# Patient Record
Sex: MALE | Race: WHITE | NOT HISPANIC OR LATINO | Employment: OTHER | URBAN - METROPOLITAN AREA
[De-identification: names, ages, dates, MRNs, and addresses within clinical notes are randomized per-mention and may not be internally consistent; named-entity substitution may affect disease eponyms.]

---

## 2018-12-11 ENCOUNTER — TRANSFERRED RECORDS (OUTPATIENT)
Dept: HEALTH INFORMATION MANAGEMENT | Facility: CLINIC | Age: 83
End: 2018-12-11

## 2019-01-03 ENCOUNTER — HOSPITAL ENCOUNTER (OUTPATIENT)
Dept: INTERVENTIONAL RADIOLOGY/VASCULAR | Facility: HOSPITAL | Age: 84
Discharge: HOME | End: 2019-01-03
Attending: FAMILY MEDICINE | Admitting: FAMILY MEDICINE
Payer: MEDICARE

## 2019-01-03 DIAGNOSIS — M16.10 HIP ARTHRITIS: ICD-10-CM

## 2019-01-03 PROCEDURE — 25500064 ZZH RX 255 OP 636: Performed by: RADIOLOGY

## 2019-01-03 PROCEDURE — 25000128 H RX IP 250 OP 636: Performed by: RADIOLOGY

## 2019-01-03 PROCEDURE — 20610 DRAIN/INJ JOINT/BURSA W/O US: CPT | Mod: TC,RT

## 2019-01-03 RX ORDER — TRIAMCINOLONE ACETONIDE 40 MG/ML
INJECTION, SUSPENSION INTRA-ARTICULAR; INTRAMUSCULAR
Status: DISCONTINUED
Start: 2019-01-03 | End: 2019-01-04 | Stop reason: HOSPADM

## 2019-01-03 RX ORDER — LIDOCAINE HYDROCHLORIDE 10 MG/ML
10 INJECTION, SOLUTION INFILTRATION; PERINEURAL ONCE
Status: COMPLETED | OUTPATIENT
Start: 2019-01-03 | End: 2019-01-03

## 2019-01-03 RX ORDER — TRIAMCINOLONE ACETONIDE 40 MG/ML
40 INJECTION, SUSPENSION INTRA-ARTICULAR; INTRAMUSCULAR ONCE
Status: COMPLETED | OUTPATIENT
Start: 2019-01-03 | End: 2019-01-03

## 2019-01-03 RX ORDER — IOPAMIDOL 612 MG/ML
50 INJECTION, SOLUTION INTRAVASCULAR ONCE
Status: COMPLETED | OUTPATIENT
Start: 2019-01-03 | End: 2019-01-03

## 2019-01-03 RX ORDER — LIDOCAINE HYDROCHLORIDE 10 MG/ML
INJECTION, SOLUTION EPIDURAL; INFILTRATION; INTRACAUDAL; PERINEURAL
Status: DISCONTINUED
Start: 2019-01-03 | End: 2019-01-04 | Stop reason: HOSPADM

## 2019-01-03 RX ADMIN — IOPAMIDOL 3 ML: 612 INJECTION, SOLUTION INTRAVENOUS at 16:02

## 2019-01-03 RX ADMIN — LIDOCAINE HYDROCHLORIDE 10 ML: 10 INJECTION, SOLUTION INFILTRATION; PERINEURAL at 16:03

## 2019-01-03 RX ADMIN — TRIAMCINOLONE ACETONIDE 40 MG: 40 INJECTION, SUSPENSION INTRA-ARTICULAR; INTRAMUSCULAR at 16:03

## 2019-01-03 NOTE — IP AVS SNAPSHOT
[facility]  [address] [address]  Phone:  [phone]  Fax:  [phone]                                    After Visit Summary   1/3/2019    Vinny Henry    MRN: [id number]           After Visit Summary Signature Page    I have received my discharge instructions, and my questions have been answered. I have discussed any challenges I see with this plan with the nurse or doctor.    ..........................................................................................................................................  Patient/Patient Representative Signature      ..........................................................................................................................................  Patient Representative Print Name and Relationship to Patient    ..................................................               ................................................  Date                                   Time    ..........................................................................................................................................  Reviewed by Signature/Title    ...................................................              ..............................................  Date                                               Time          22EPIC Rev 08/18

## 2019-01-03 NOTE — DISCHARGE INSTRUCTIONS
Home number on file [phone] (home)  Is it ok to leave a message at this number(s)? Yes    Dr French completed your procedure on 1/3/2019.    Current Pain Level (0-10 Scale): 5/10  Post Pain Level (0-10):  2/10    Radiology Discharge instructions for Steroid Injection    Activity Level:     Do not do any heavy activity or exercise for 24 hours.   Do not drive for 4 hours after your injection.  Diet:   Return to your normal diet.  Medications:   If you have stopped taking your Aspirin, Coumadin/Warfarin, Ibuprofen, or any   other blood thinner for this procedure you may resume in the morning unless   your primary care provider has given you other instructions.    Diabetics may see an increase in blood sugar after steroid injections. If you are concerned about your blood sugar, please contact your family doctor.    Site Care:  Remove the bandage and bathe or shower the morning after the procedure.      Please allow two weeks to experience improvement in your pain.  If you have any further issues, please contact your provider.    Call your Primary Care Provider if you have the following (if your primary care provider is not available please seek emergency care):   Nausea with vomiting   Severe headache   Drowsiness or confusion   Redness or drainage at the injection or puncture site   Temperature over 101 degrees F   Other concerns   Worsening back pain   Stiff neck

## 2019-01-03 NOTE — IP AVS SNAPSHOT
MRN:[id number]                      After Visit Summary   1/3/2019    Vinny Henry    MRN: [id number]           Visit Information        Provider Department      1/3/2019  3:30 PM [facility]; [facility]           Review of your medicines      UNREVIEWED medicines. Ask your doctor about these medicines       Dose / Directions   calcium carbonate 600 mg-vitamin D 400 units 600-400 MG-UNIT per tablet  Commonly known as:  CALTRATE      Dose:  1 tablet  Take 1 tablet by mouth daily  Refills:  0     FLOMAX 0.4 MG capsule  Generic drug:  tamsulosin      Dose:  0.4 mg  Take 0.4 mg by mouth  Refills:  0     HYDROcodone-acetaminophen 5-325 MG tablet  Commonly known as:  NORCO      Dose:  1 tablet  Take 1 tablet by mouth every 6 hours as needed for moderate to severe pain  Quantity:  15 tablet  Refills:  0     OMEPRAZOLE PO      Dose:  20 mg  Take 20 mg by mouth every morning  Refills:  0     polyethylene glycol packet  Commonly known as:  MIRALAX/GLYCOLAX      Dose:  1 packet  Take 1 packet by mouth as needed  Refills:  0     TERAZOSIN HCL PO      Dose:  1 mg  Take 1 mg by mouth Med was stopped by MD last Friday, pt took one last night to see if it would help him pass his urine.  Refills:  0     * UNKNOWN TO PATIENT      To shrink his prostate, blue pill  Refills:  0     * UNKNOWN TO PATIENT      New antibiotic  Refills:  0         * This list has 2 medication(s) that are the same as other medications prescribed for you. Read the directions carefully, and ask your doctor or other care provider to review them with you.                  Protect others around you: Learn how to safely use, store and throw away your medicines at www.disposemymeds.org.       Follow-ups after your visit       Care Instructions       Further instructions from your care team       Home number on file [phone] (home)  Is it ok to leave a message at this number(s)? Yes    Dr French completed your procedure on  1/3/2019.    Current Pain Level (0-10 Scale): 5/10  Post Pain Level (0-10):  2/10    Radiology Discharge instructions for Steroid Injection    Activity Level:     Do not do any heavy activity or exercise for 24 hours.   Do not drive for 4 hours after your injection.  Diet:   Return to your normal diet.  Medications:   If you have stopped taking your Aspirin, Coumadin/Warfarin, Ibuprofen, or any   other blood thinner for this procedure you may resume in the morning unless   your primary care provider has given you other instructions.    Diabetics may see an increase in blood sugar after steroid injections. If you are concerned about your blood sugar, please contact your family doctor.    Site Care:  Remove the bandage and bathe or shower the morning after the procedure.      Please allow two weeks to experience improvement in your pain.  If you have any further issues, please contact your provider.    Call your Primary Care Provider if you have the following (if your primary care provider is not available please seek emergency care):   Nausea with vomiting   Severe headache   Drowsiness or confusion   Redness or drainage at the injection or puncture site   Temperature over 101 degrees F   Other concerns   Worsening back pain   Stiff neck          Additional Information About Your Visit       Care EveryWhere ID    This is your Care EveryWhere ID. This could be used by other organizations to access your [facility] medical records  [id number]        Primary Care Provider Office Phone # Fax #    Johanna Duran [phone]      Equal Access to Services    [facility] : Hadii aad ku hadasho Soalonzoali, waaxda luqadaha, qaybta kaalmada adeegyajames, chloe krueger . [facility] [phone].    ATENCIÓN: Si habla español, tiene a armstrong disposición servicios gratuitos de asistencia lingüística. Llame al [phone].    We comply with applicable federal civil rights laws and [place]  laws. We do not discriminate on the basis of race, color, national origin, age, disability, sex, sexual orientation, or gender identity.           Thank you!    Thank you for choosing [facility] for your care. Our goal is always to provide you with excellent care. Hearing back from our patients is one way we can continue to improve our services. Please take a few minutes to complete the written survey that you may receive in the mail after you visit with us. Thank you!            Medication List      ASK your doctor about these medications          Morning Afternoon Evening Bedtime As Needed    calcium carbonate 600 mg-vitamin D 400 units 600-400 MG-UNIT per tablet  Also known as:  CALTRATE  INSTRUCTIONS:  Take 1 tablet by mouth daily                     FLOMAX 0.4 MG capsule  INSTRUCTIONS:  Take 0.4 mg by mouth  Generic drug:  tamsulosin                     HYDROcodone-acetaminophen 5-325 MG tablet  Also known as:  NORCO  INSTRUCTIONS:  Take 1 tablet by mouth every 6 hours as needed for moderate to severe pain                     OMEPRAZOLE PO  INSTRUCTIONS:  Take 20 mg by mouth every morning                     polyethylene glycol packet  Also known as:  MIRALAX/GLYCOLAX  INSTRUCTIONS:  Take 1 packet by mouth as needed                     TERAZOSIN HCL PO  INSTRUCTIONS:  Take 1 mg by mouth Med was stopped by MD last Friday, pt took one last night to see if it would help him pass his urine.                     * UNKNOWN TO PATIENT  INSTRUCTIONS:  To shrink his prostate, blue pill                     * UNKNOWN TO PATIENT  INSTRUCTIONS:  New antibiotic                        * This list has 2 medication(s) that are the same as other medications prescribed for you. Read the directions carefully, and ask your doctor or other care provider to review them with you.

## 2019-03-12 ENCOUNTER — TRANSFERRED RECORDS (OUTPATIENT)
Dept: HEALTH INFORMATION MANAGEMENT | Facility: HOSPITAL | Age: 84
End: 2019-03-12

## 2019-04-15 ENCOUNTER — TRANSFERRED RECORDS (OUTPATIENT)
Dept: HEALTH INFORMATION MANAGEMENT | Facility: HOSPITAL | Age: 84
End: 2019-04-15

## 2019-04-15 RX ORDER — FINASTERIDE 5 MG/1
5 TABLET, FILM COATED ORAL DAILY
COMMUNITY
End: 2024-06-03

## 2019-04-15 NOTE — OR NURSING
Email sent to total joint replacement team at [facility] as follows;    We have Vinny Henry : [birth year] coming  for RTHA with Dr. Medel, PCP Dr. Duran.  Gabe attended total joint replacement class on 04/10/19 and he has had his left hip replaced.  He has history of falling in hospital after left hip replacement because he got up out of bed on his own.  He plans on going to his home in [place] after hospitalization with his wife s assistance.  He has three steps to enter home and then three step to enter kitchen.  He does not have a walker, or grab bars, does have a higher toilet.    Reviewed the following topics with Gabe and his wife;  Call don t Fall , Capnography Monitoring, Signs & Symptoms of Infection to watch/report and prevent.  They both verbalized good understanding of all topics, especially  Call don t Fall .    Thank you,  Anabel Avalos R.N.  Pre-Anesthesia Testing Surgical Education  [facility]

## 2019-04-16 RX ORDER — ACETAMINOPHEN 500 MG
500-1000 TABLET ORAL EVERY 6 HOURS PRN
COMMUNITY

## 2019-04-18 ENCOUNTER — ANESTHESIA EVENT (OUTPATIENT)
Dept: SURGERY | Facility: HOSPITAL | Age: 84
DRG: 470 | End: 2019-04-18
Payer: MEDICARE

## 2019-04-18 ASSESSMENT — LIFESTYLE VARIABLES: TOBACCO_USE: 1

## 2019-04-18 NOTE — ANESTHESIA PREPROCEDURE EVALUATION
"Anesthesia Pre-Procedure Evaluation    Patient: Vinny Henry   MRN: [id number] : 1935          Preoperative Diagnosis: DJD RIGHT HIP    Procedure(s):  DIRECT ANTERIOR RIGHT TOTAL HIP    History reviewed. No pertinent past medical history.  Past Surgical History:   Procedure Laterality Date     ARTHROPLASTY HIP      left       Anesthesia Evaluation     . Pt has had prior anesthetic.     No history of anesthetic complications          ROS/MED HX    ENT/Pulmonary:     (+)tobacco use, Past use , . .    Neurologic:  - neg neurologic ROS     Cardiovascular: Comment: Had a stress test in 2012, EF 64%, no issues    At time of L hip replacement in 2013 had some \"dropped p waves\" noted by anesthesia.  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  Pt has urologic surgery since that time with no issues    (+) hypertension----. : . . . :. . Previous cardiac testing date:results:date: results:ECG reviewed date:4-15-19 results:NSR with old t waves noted date: results:          METS/Exercise Tolerance:  3 - Able to walk 1-2 blocks without stopping   Hematologic:  - neg hematologic  ROS       Musculoskeletal:   (+) arthritis,  -       GI/Hepatic:     (+) GERD       Renal/Genitourinary:  - ROS Renal section negative       Endo:     (+) Obesity, .      Psychiatric:  - neg psychiatric ROS       Infectious Disease:  - neg infectious disease ROS       Malignancy:   (+) Malignancy History of Other  Other CA throat Remission status post Radiation         Other:    (+) C-spine cleared: N/A, no H/O Chronic Pain,no other significant disability                         Physical Exam  Normal systems: cardiovascular    Airway   Mallampati: II  TM distance: >3 FB  Neck ROM: full    Dental   (+) upper dentures and lower dentures    Cardiovascular "   Rhythm and rate: regular and normal      Pulmonary (+) decreased breath sounds               No results found for: WBC, HGB, HCT, PLT, CRP, SED, NA, POTASSIUM, CHLORIDE, CO2, BUN, CR, GLC, JANINE, PHOS, MAG, ALBUMIN, PROTTOTAL, ALT, AST, GGT, ALKPHOS, BILITOTAL, BILIDIRECT, LIPASE, AMYLASE, AMARA, PTT, INR, FIBR, TSH, T4, T3, HCG, HCGS, CKTOTAL, CKMB, TROPN    Preop Vitals  BP Readings from Last 3 Encounters:   07/01/16 147/86   03/22/14 125/76   02/16/14 123/88    Pulse Readings from Last 3 Encounters:   07/01/16 67      Resp Readings from Last 3 Encounters:   07/01/16 20   03/22/14 20   02/16/14 20    SpO2 Readings from Last 3 Encounters:   07/01/16 97%   02/16/14 94%   12/13/13 97%      Temp Readings from Last 1 Encounters:   07/01/16 98.3  F (36.8  C) (Tympanic)    Ht Readings from Last 1 Encounters:   No data found for Ht      Wt Readings from Last 1 Encounters:   No data found for Wt    There is no height or weight on file to calculate BMI.       Anesthesia Plan      History & Physical Review  History and physical reviewed and following examination; no interval change.    ASA Status:  3 .    NPO Status:  > 8 hours (0630 sip of water with pills, last night for food)    Plan for Spinal and MAC Maintenance will be TIVA.    PONV prophylaxis:  Ondansetron (or other 5HT-3) and Dexamethasone or Solumedrol  HP 4-15-19      Postoperative Care  Postoperative pain management:  IV analgesics.      Consents  Anesthetic plan, risks, benefits and alternatives discussed with:  Patient..                 MARU Limon CRNA

## 2019-04-22 ENCOUNTER — APPOINTMENT (OUTPATIENT)
Dept: GENERAL RADIOLOGY | Facility: HOSPITAL | Age: 84
DRG: 470 | End: 2019-04-22
Attending: ORTHOPAEDIC SURGERY
Payer: MEDICARE

## 2019-04-22 ENCOUNTER — HOSPITAL ENCOUNTER (INPATIENT)
Facility: HOSPITAL | Age: 84
LOS: 2 days | Discharge: HOME | DRG: 470 | End: 2019-04-24
Attending: ORTHOPAEDIC SURGERY | Admitting: ORTHOPAEDIC SURGERY
Payer: MEDICARE

## 2019-04-22 ENCOUNTER — APPOINTMENT (OUTPATIENT)
Dept: PHYSICAL THERAPY | Facility: HOSPITAL | Age: 84
DRG: 470 | End: 2019-04-22
Attending: ORTHOPAEDIC SURGERY
Payer: MEDICARE

## 2019-04-22 ENCOUNTER — ANESTHESIA (OUTPATIENT)
Dept: SURGERY | Facility: HOSPITAL | Age: 84
DRG: 470 | End: 2019-04-22
Payer: MEDICARE

## 2019-04-22 DIAGNOSIS — Z96.641 STATUS POST RIGHT HIP REPLACEMENT: Primary | ICD-10-CM

## 2019-04-22 PROCEDURE — 40000277 XR SURGERY CARM FLUORO LESS THAN 5 MIN W STILLS: Mod: TC

## 2019-04-22 PROCEDURE — 25000128 H RX IP 250 OP 636: Performed by: NURSE ANESTHETIST, CERTIFIED REGISTERED

## 2019-04-22 PROCEDURE — 71000015 ZZH RECOVERY PHASE 1 LEVEL 2 EA ADDTL HR: Performed by: ORTHOPAEDIC SURGERY

## 2019-04-22 PROCEDURE — C9290 INJ, BUPIVACAINE LIPOSOME: HCPCS | Performed by: ORTHOPAEDIC SURGERY

## 2019-04-22 PROCEDURE — 37000008 ZZH ANESTHESIA TECHNICAL FEE, 1ST 30 MIN: Performed by: ORTHOPAEDIC SURGERY

## 2019-04-22 PROCEDURE — 71000014 ZZH RECOVERY PHASE 1 LEVEL 2 FIRST HR: Performed by: ORTHOPAEDIC SURGERY

## 2019-04-22 PROCEDURE — 27110028 ZZH OR GENERAL SUPPLY NON-STERILE: Performed by: ORTHOPAEDIC SURGERY

## 2019-04-22 PROCEDURE — C1776 JOINT DEVICE (IMPLANTABLE): HCPCS | Performed by: ORTHOPAEDIC SURGERY

## 2019-04-22 PROCEDURE — 25800030 ZZH RX IP 258 OP 636: Performed by: ORTHOPAEDIC SURGERY

## 2019-04-22 PROCEDURE — 25000125 ZZHC RX 250: Performed by: NURSE ANESTHETIST, CERTIFIED REGISTERED

## 2019-04-22 PROCEDURE — 99231 SBSQ HOSP IP/OBS SF/LOW 25: CPT | Performed by: INTERNAL MEDICINE

## 2019-04-22 PROCEDURE — 25000128 H RX IP 250 OP 636: Performed by: ORTHOPAEDIC SURGERY

## 2019-04-22 PROCEDURE — 12000000 ZZH R&B MED SURG/OB

## 2019-04-22 PROCEDURE — 40000985 XR PELVIS PORT 1/2 VW: Mod: TC

## 2019-04-22 PROCEDURE — 25000125 ZZHC RX 250: Performed by: ORTHOPAEDIC SURGERY

## 2019-04-22 PROCEDURE — 37000009 ZZH ANESTHESIA TECHNICAL FEE, EACH ADDTL 15 MIN: Performed by: ORTHOPAEDIC SURGERY

## 2019-04-22 PROCEDURE — 0SR906Z REPLACEMENT OF RIGHT HIP JOINT WITH OXIDIZED ZIRCONIUM ON POLYETHYLENE SYNTHETIC SUBSTITUTE, OPEN APPROACH: ICD-10-PCS | Performed by: ORTHOPAEDIC SURGERY

## 2019-04-22 PROCEDURE — 25000132 ZZH RX MED GY IP 250 OP 250 PS 637: Performed by: ORTHOPAEDIC SURGERY

## 2019-04-22 PROCEDURE — 99100 ANES PT EXTEME AGE<1 YR&>70: CPT | Performed by: NURSE ANESTHETIST, CERTIFIED REGISTERED

## 2019-04-22 PROCEDURE — 25800030 ZZH RX IP 258 OP 636: Performed by: NURSE ANESTHETIST, CERTIFIED REGISTERED

## 2019-04-22 PROCEDURE — 88300 SURGICAL PATH GROSS: CPT | Mod: TC | Performed by: ORTHOPAEDIC SURGERY

## 2019-04-22 PROCEDURE — 97161 PT EVAL LOW COMPLEX 20 MIN: CPT | Mod: GP

## 2019-04-22 PROCEDURE — 40000275 ZZH STATISTIC RCP TIME EA 10 MIN

## 2019-04-22 PROCEDURE — 27210794 ZZH OR GENERAL SUPPLY STERILE: Performed by: ORTHOPAEDIC SURGERY

## 2019-04-22 PROCEDURE — A9270 NON-COVERED ITEM OR SERVICE: HCPCS | Performed by: ORTHOPAEDIC SURGERY

## 2019-04-22 PROCEDURE — 27130 TOTAL HIP ARTHROPLASTY: CPT | Performed by: NURSE ANESTHETIST, CERTIFIED REGISTERED

## 2019-04-22 PROCEDURE — 40000786 ZZHCL STATISTIC ACTIVE MRSA SURVEILLANCE CULTURE: Performed by: ORTHOPAEDIC SURGERY

## 2019-04-22 PROCEDURE — 40000305 ZZH STATISTIC PRE PROC ASSESS I: Performed by: ORTHOPAEDIC SURGERY

## 2019-04-22 PROCEDURE — 97110 THERAPEUTIC EXERCISES: CPT | Mod: GP

## 2019-04-22 PROCEDURE — 36000063 ZZH SURGERY LEVEL 4 EA 15 ADDTL MIN: Performed by: ORTHOPAEDIC SURGERY

## 2019-04-22 PROCEDURE — 36000065 ZZH SURGERY LEVEL 4 W FLUORO 1ST 30 MIN: Performed by: ORTHOPAEDIC SURGERY

## 2019-04-22 DEVICE — SCREW-SPHERICAL HEAD CANCELLOUS 25MM: Type: IMPLANTABLE DEVICE | Site: HIP | Status: FUNCTIONAL

## 2019-04-22 DEVICE — THREADED HOLE COVER-REFLECTION: Type: IMPLANTABLE DEVICE | Site: HIP | Status: FUNCTIONAL

## 2019-04-22 RX ORDER — SODIUM CHLORIDE, SODIUM LACTATE, POTASSIUM CHLORIDE, CALCIUM CHLORIDE 600; 310; 30; 20 MG/100ML; MG/100ML; MG/100ML; MG/100ML
INJECTION, SOLUTION INTRAVENOUS CONTINUOUS
Status: DISCONTINUED | OUTPATIENT
Start: 2019-04-22 | End: 2019-04-22 | Stop reason: HOSPADM

## 2019-04-22 RX ORDER — METOCLOPRAMIDE HYDROCHLORIDE 5 MG/ML
5 INJECTION INTRAMUSCULAR; INTRAVENOUS EVERY 6 HOURS PRN
Status: DISCONTINUED | OUTPATIENT
Start: 2019-04-22 | End: 2019-04-24 | Stop reason: HOSPADM

## 2019-04-22 RX ORDER — FENTANYL CITRATE 50 UG/ML
25-50 INJECTION, SOLUTION INTRAMUSCULAR; INTRAVENOUS
Status: DISCONTINUED | OUTPATIENT
Start: 2019-04-22 | End: 2019-04-22 | Stop reason: HOSPADM

## 2019-04-22 RX ORDER — HYDROMORPHONE HYDROCHLORIDE 1 MG/ML
.3-.5 INJECTION, SOLUTION INTRAMUSCULAR; INTRAVENOUS; SUBCUTANEOUS
Status: DISCONTINUED | OUTPATIENT
Start: 2019-04-22 | End: 2019-04-24 | Stop reason: HOSPADM

## 2019-04-22 RX ORDER — PROPOFOL 10 MG/ML
INJECTION, EMULSION INTRAVENOUS PRN
Status: DISCONTINUED | OUTPATIENT
Start: 2019-04-22 | End: 2019-04-22

## 2019-04-22 RX ORDER — METOCLOPRAMIDE 5 MG/1
5 TABLET ORAL EVERY 6 HOURS PRN
Status: DISCONTINUED | OUTPATIENT
Start: 2019-04-22 | End: 2019-04-24 | Stop reason: HOSPADM

## 2019-04-22 RX ORDER — FINASTERIDE 5 MG/1
5 TABLET, FILM COATED ORAL DAILY
Status: DISCONTINUED | OUTPATIENT
Start: 2019-04-23 | End: 2019-04-24 | Stop reason: HOSPADM

## 2019-04-22 RX ORDER — ACETAMINOPHEN 325 MG/1
650-975 TABLET ORAL EVERY 6 HOURS PRN
Status: DISCONTINUED | OUTPATIENT
Start: 2019-04-22 | End: 2019-04-24 | Stop reason: HOSPADM

## 2019-04-22 RX ORDER — FENTANYL CITRATE 50 UG/ML
INJECTION, SOLUTION INTRAMUSCULAR; INTRAVENOUS PRN
Status: DISCONTINUED | OUTPATIENT
Start: 2019-04-22 | End: 2019-04-22

## 2019-04-22 RX ORDER — CEFAZOLIN SODIUM 2 G/100ML
2 INJECTION, SOLUTION INTRAVENOUS EVERY 8 HOURS
Status: COMPLETED | OUTPATIENT
Start: 2019-04-22 | End: 2019-04-22

## 2019-04-22 RX ORDER — HYDROXYZINE HYDROCHLORIDE 10 MG/1
10 TABLET, FILM COATED ORAL EVERY 6 HOURS PRN
Status: DISCONTINUED | OUTPATIENT
Start: 2019-04-22 | End: 2019-04-24 | Stop reason: HOSPADM

## 2019-04-22 RX ORDER — ALBUTEROL SULFATE 0.83 MG/ML
2.5 SOLUTION RESPIRATORY (INHALATION) EVERY 4 HOURS PRN
Status: DISCONTINUED | OUTPATIENT
Start: 2019-04-22 | End: 2019-04-22 | Stop reason: HOSPADM

## 2019-04-22 RX ORDER — OXYCODONE HYDROCHLORIDE 5 MG/1
5-10 TABLET ORAL EVERY 4 HOURS PRN
Status: DISCONTINUED | OUTPATIENT
Start: 2019-04-22 | End: 2019-04-24 | Stop reason: HOSPADM

## 2019-04-22 RX ORDER — LIDOCAINE 40 MG/G
CREAM TOPICAL
Status: DISCONTINUED | OUTPATIENT
Start: 2019-04-26 | End: 2019-04-24 | Stop reason: HOSPADM

## 2019-04-22 RX ORDER — ONDANSETRON 4 MG/1
4 TABLET, ORALLY DISINTEGRATING ORAL EVERY 6 HOURS PRN
Status: DISCONTINUED | OUTPATIENT
Start: 2019-04-22 | End: 2019-04-24 | Stop reason: HOSPADM

## 2019-04-22 RX ORDER — PROPOFOL 10 MG/ML
INJECTION, EMULSION INTRAVENOUS CONTINUOUS PRN
Status: DISCONTINUED | OUTPATIENT
Start: 2019-04-22 | End: 2019-04-22

## 2019-04-22 RX ORDER — NALOXONE HYDROCHLORIDE 0.4 MG/ML
.1-.4 INJECTION, SOLUTION INTRAMUSCULAR; INTRAVENOUS; SUBCUTANEOUS
Status: DISCONTINUED | OUTPATIENT
Start: 2019-04-22 | End: 2019-04-22 | Stop reason: HOSPADM

## 2019-04-22 RX ORDER — ONDANSETRON 2 MG/ML
4 INJECTION INTRAMUSCULAR; INTRAVENOUS EVERY 30 MIN PRN
Status: DISCONTINUED | OUTPATIENT
Start: 2019-04-22 | End: 2019-04-22 | Stop reason: HOSPADM

## 2019-04-22 RX ORDER — SODIUM CHLORIDE 9 MG/ML
100 INJECTION, SOLUTION INTRAVENOUS CONTINUOUS
Status: DISCONTINUED | OUTPATIENT
Start: 2019-04-22 | End: 2019-04-23

## 2019-04-22 RX ORDER — AMOXICILLIN 250 MG
2 CAPSULE ORAL 2 TIMES DAILY
Status: DISCONTINUED | OUTPATIENT
Start: 2019-04-22 | End: 2019-04-24 | Stop reason: HOSPADM

## 2019-04-22 RX ORDER — HYDROMORPHONE HYDROCHLORIDE 1 MG/ML
.3-.5 INJECTION, SOLUTION INTRAMUSCULAR; INTRAVENOUS; SUBCUTANEOUS EVERY 10 MIN PRN
Status: DISCONTINUED | OUTPATIENT
Start: 2019-04-22 | End: 2019-04-22 | Stop reason: HOSPADM

## 2019-04-22 RX ORDER — EPHEDRINE SULFATE 50 MG/ML
INJECTION, SOLUTION INTRAVENOUS PRN
Status: DISCONTINUED | OUTPATIENT
Start: 2019-04-22 | End: 2019-04-22

## 2019-04-22 RX ORDER — ONDANSETRON 2 MG/ML
INJECTION INTRAMUSCULAR; INTRAVENOUS PRN
Status: DISCONTINUED | OUTPATIENT
Start: 2019-04-22 | End: 2019-04-22

## 2019-04-22 RX ORDER — AMOXICILLIN 250 MG
1 CAPSULE ORAL 2 TIMES DAILY
Status: DISCONTINUED | OUTPATIENT
Start: 2019-04-22 | End: 2019-04-24 | Stop reason: HOSPADM

## 2019-04-22 RX ORDER — CEFAZOLIN SODIUM 2 G/100ML
2 INJECTION, SOLUTION INTRAVENOUS
Status: COMPLETED | OUTPATIENT
Start: 2019-04-22 | End: 2019-04-22

## 2019-04-22 RX ORDER — PROCHLORPERAZINE MALEATE 5 MG
5 TABLET ORAL EVERY 6 HOURS PRN
Status: DISCONTINUED | OUTPATIENT
Start: 2019-04-22 | End: 2019-04-24 | Stop reason: HOSPADM

## 2019-04-22 RX ORDER — DEXAMETHASONE SODIUM PHOSPHATE 10 MG/ML
INJECTION, SOLUTION INTRAMUSCULAR; INTRAVENOUS PRN
Status: DISCONTINUED | OUTPATIENT
Start: 2019-04-22 | End: 2019-04-22

## 2019-04-22 RX ORDER — BUPIVACAINE HYDROCHLORIDE 7.5 MG/ML
INJECTION, SOLUTION INTRASPINAL PRN
Status: DISCONTINUED | OUTPATIENT
Start: 2019-04-22 | End: 2019-04-22

## 2019-04-22 RX ORDER — MEPERIDINE HYDROCHLORIDE 50 MG/ML
12.5 INJECTION INTRAMUSCULAR; INTRAVENOUS; SUBCUTANEOUS
Status: DISCONTINUED | OUTPATIENT
Start: 2019-04-22 | End: 2019-04-22 | Stop reason: HOSPADM

## 2019-04-22 RX ORDER — ONDANSETRON 2 MG/ML
4 INJECTION INTRAMUSCULAR; INTRAVENOUS EVERY 6 HOURS PRN
Status: DISCONTINUED | OUTPATIENT
Start: 2019-04-22 | End: 2019-04-24 | Stop reason: HOSPADM

## 2019-04-22 RX ORDER — HYDRALAZINE HYDROCHLORIDE 20 MG/ML
2.5-5 INJECTION INTRAMUSCULAR; INTRAVENOUS EVERY 10 MIN PRN
Status: DISCONTINUED | OUTPATIENT
Start: 2019-04-22 | End: 2019-04-22 | Stop reason: HOSPADM

## 2019-04-22 RX ORDER — BUPIVACAINE HYDROCHLORIDE AND EPINEPHRINE 2.5; 5 MG/ML; UG/ML
INJECTION, SOLUTION INFILTRATION; PERINEURAL PRN
Status: DISCONTINUED | OUTPATIENT
Start: 2019-04-22 | End: 2019-04-22 | Stop reason: HOSPADM

## 2019-04-22 RX ORDER — ONDANSETRON 4 MG/1
4 TABLET, ORALLY DISINTEGRATING ORAL EVERY 30 MIN PRN
Status: DISCONTINUED | OUTPATIENT
Start: 2019-04-22 | End: 2019-04-22 | Stop reason: HOSPADM

## 2019-04-22 RX ORDER — NALOXONE HYDROCHLORIDE 0.4 MG/ML
.1-.4 INJECTION, SOLUTION INTRAMUSCULAR; INTRAVENOUS; SUBCUTANEOUS
Status: DISCONTINUED | OUTPATIENT
Start: 2019-04-22 | End: 2019-04-24 | Stop reason: HOSPADM

## 2019-04-22 RX ADMIN — FENTANYL CITRATE 50 MCG: 50 INJECTION, SOLUTION INTRAMUSCULAR; INTRAVENOUS at 07:31

## 2019-04-22 RX ADMIN — TRANEXAMIC ACID 1 G: 1 INJECTION, SOLUTION INTRAVENOUS at 07:31

## 2019-04-22 RX ADMIN — TRANEXAMIC ACID 1 G: 1 INJECTION, SOLUTION INTRAVENOUS at 08:50

## 2019-04-22 RX ADMIN — ONDANSETRON 4 MG: 2 INJECTION INTRAMUSCULAR; INTRAVENOUS at 09:00

## 2019-04-22 RX ADMIN — EPHEDRINE SULFATE 5 MG: 50 INJECTION, SOLUTION INTRAVENOUS at 08:55

## 2019-04-22 RX ADMIN — FENTANYL CITRATE 25 MCG: 50 INJECTION, SOLUTION INTRAMUSCULAR; INTRAVENOUS at 07:40

## 2019-04-22 RX ADMIN — SODIUM CHLORIDE 100 ML/HR: 9 INJECTION, SOLUTION INTRAVENOUS at 11:49

## 2019-04-22 RX ADMIN — FENTANYL CITRATE 25 MCG: 50 INJECTION, SOLUTION INTRAMUSCULAR; INTRAVENOUS at 07:45

## 2019-04-22 RX ADMIN — PROPOFOL 10 MG: 10 INJECTION, EMULSION INTRAVENOUS at 07:42

## 2019-04-22 RX ADMIN — BUPIVACAINE HYDROCHLORIDE IN DEXTROSE 1.4 ML: 7.5 INJECTION, SOLUTION SUBARACHNOID at 07:40

## 2019-04-22 RX ADMIN — OXYCODONE HYDROCHLORIDE 5 MG: 5 TABLET ORAL at 18:00

## 2019-04-22 RX ADMIN — CEFAZOLIN SODIUM 2 G: 2 INJECTION, SOLUTION INTRAVENOUS at 07:21

## 2019-04-22 RX ADMIN — SODIUM CHLORIDE, POTASSIUM CHLORIDE, SODIUM LACTATE AND CALCIUM CHLORIDE: 600; 310; 30; 20 INJECTION, SOLUTION INTRAVENOUS at 07:29

## 2019-04-22 RX ADMIN — PHENYLEPHRINE HYDROCHLORIDE 100 MCG: 10 INJECTION, SOLUTION INTRAMUSCULAR; INTRAVENOUS; SUBCUTANEOUS at 08:17

## 2019-04-22 RX ADMIN — CEFAZOLIN SODIUM 2 G: 2 INJECTION, SOLUTION INTRAVENOUS at 22:40

## 2019-04-22 RX ADMIN — OXYCODONE HYDROCHLORIDE 10 MG: 5 TABLET ORAL at 22:03

## 2019-04-22 RX ADMIN — PHENYLEPHRINE HYDROCHLORIDE 200 MCG: 10 INJECTION, SOLUTION INTRAMUSCULAR; INTRAVENOUS; SUBCUTANEOUS at 08:48

## 2019-04-22 RX ADMIN — DEXAMETHASONE SODIUM PHOSPHATE 10 MG: 10 INJECTION, SOLUTION INTRAMUSCULAR; INTRAVENOUS at 08:00

## 2019-04-22 RX ADMIN — PHENYLEPHRINE HYDROCHLORIDE 200 MCG: 10 INJECTION, SOLUTION INTRAMUSCULAR; INTRAVENOUS; SUBCUTANEOUS at 08:22

## 2019-04-22 RX ADMIN — SODIUM CHLORIDE 100 ML/HR: 9 INJECTION, SOLUTION INTRAVENOUS at 22:06

## 2019-04-22 RX ADMIN — PHENYLEPHRINE HYDROCHLORIDE 100 MCG: 10 INJECTION, SOLUTION INTRAMUSCULAR; INTRAVENOUS; SUBCUTANEOUS at 08:13

## 2019-04-22 RX ADMIN — PROPOFOL 20 MG: 10 INJECTION, EMULSION INTRAVENOUS at 07:40

## 2019-04-22 RX ADMIN — PROPOFOL 20 MG: 10 INJECTION, EMULSION INTRAVENOUS at 07:38

## 2019-04-22 RX ADMIN — PHENYLEPHRINE HYDROCHLORIDE 100 MCG: 10 INJECTION, SOLUTION INTRAMUSCULAR; INTRAVENOUS; SUBCUTANEOUS at 07:50

## 2019-04-22 RX ADMIN — SENNOSIDES AND DOCUSATE SODIUM 1 TABLET: 8.6; 5 TABLET ORAL at 22:03

## 2019-04-22 RX ADMIN — OXYCODONE HYDROCHLORIDE 5 MG: 5 TABLET ORAL at 13:41

## 2019-04-22 RX ADMIN — SODIUM CHLORIDE, POTASSIUM CHLORIDE, SODIUM LACTATE AND CALCIUM CHLORIDE: 600; 310; 30; 20 INJECTION, SOLUTION INTRAVENOUS at 07:21

## 2019-04-22 RX ADMIN — SENNOSIDES AND DOCUSATE SODIUM 1 TABLET: 8.6; 5 TABLET ORAL at 11:46

## 2019-04-22 RX ADMIN — PROPOFOL 50 MCG/KG/MIN: 10 INJECTION, EMULSION INTRAVENOUS at 07:58

## 2019-04-22 RX ADMIN — ACETAMINOPHEN 975 MG: 325 TABLET, FILM COATED ORAL at 18:50

## 2019-04-22 RX ADMIN — SODIUM CHLORIDE, POTASSIUM CHLORIDE, SODIUM LACTATE AND CALCIUM CHLORIDE: 600; 310; 30; 20 INJECTION, SOLUTION INTRAVENOUS at 10:05

## 2019-04-22 RX ADMIN — PHENYLEPHRINE HYDROCHLORIDE 200 MCG: 10 INJECTION, SOLUTION INTRAMUSCULAR; INTRAVENOUS; SUBCUTANEOUS at 08:36

## 2019-04-22 RX ADMIN — CEFAZOLIN SODIUM 2 G: 2 INJECTION, SOLUTION INTRAVENOUS at 14:43

## 2019-04-22 RX ADMIN — SODIUM CHLORIDE, POTASSIUM CHLORIDE, SODIUM LACTATE AND CALCIUM CHLORIDE: 600; 310; 30; 20 INJECTION, SOLUTION INTRAVENOUS at 08:56

## 2019-04-22 ASSESSMENT — MIFFLIN-ST. JEOR: SCORE: 1653.8

## 2019-04-22 ASSESSMENT — ACTIVITIES OF DAILY LIVING (ADL)
ADLS_ACUITY_SCORE: 12
ADLS_ACUITY_SCORE: 14
ADLS_ACUITY_SCORE: 14

## 2019-04-22 NOTE — PLAN OF CARE
Patient admitted today following right total hip arthroplasty, there does not appear to be break through bleeding, the affected side has ice in place, patient has adequate CMS, patient has tolerated a regular diet with no nausea, upon admission there are some red yeasty areas noted to the groin and patient has a large amount of moles noted thought his body,thee are no open areas, the jett area is reddened, patient has been placed slightly off of the coccyx as tolerated, patient has denied pain, and reported the right hip is numb at this time, patient makes his needs known well, patient encouraged to use the call system to alert staff of his needs, patient is due to void, and verbalized he will call before getting out of bed, patient reported he fell after his last surgery, and stated he will call, however the bed alarm is on and audible.

## 2019-04-22 NOTE — PLAN OF CARE
Patient reports urine retention, unable to void post surgical, bladder scan was 415 cc per bladder scan patient reports slight urge to void.

## 2019-04-22 NOTE — ANESTHESIA CARE TRANSFER NOTE
Patient: Vinny Henry    Procedure(s):  DIRECT ANTERIOR RIGHT TOTAL HIP    Diagnosis: DJD RIGHT HIP  Diagnosis Additional Information: No value filed.    Anesthesia Type:   Spinal, MAC     Note:  Airway :Nasal Cannula  Patient transferred to:PACU  Handoff Report: Identifed the Patient, Identified the Reponsible Provider, Reviewed the pertinent medical history, Discussed the surgical course, Reviewed Intra-OP anesthesia mangement and issues during anesthesia, Set expectations for post-procedure period and Allowed opportunity for questions and acknowledgement of understanding      Vitals: (Last set prior to Anesthesia Care Transfer)    CRNA VITALS  4/22/2019 0852 - 4/22/2019 0929      4/22/2019             Pulse:  55    SpO2:  98 %    Resp Rate (set):  8                Electronically Signed By: MARU Moreno CRNA  April 22, 2019  9:29 AM  
30 or less

## 2019-04-22 NOTE — ANESTHESIA POSTPROCEDURE EVALUATION
Patient: Vinny Henry    Procedure(s):  DIRECT ANTERIOR RIGHT TOTAL HIP    Diagnosis:DJD RIGHT HIP  Diagnosis Additional Information: No value filed.    Anesthesia Type:  Spinal, MAC    Note:  Anesthesia Post Evaluation    Patient location during evaluation: Bedside  Patient participation: Able to fully participate in evaluation  Level of consciousness: awake and alert  Pain management: adequate  Airway patency: patent  Cardiovascular status: acceptable and stable  Respiratory status: acceptable and nasal cannula  Hydration status: acceptable  PONV: none     Anesthetic complications: None          Last vitals:  Vitals:    04/22/19 1130 04/22/19 1150 04/22/19 1343   BP: 127/60 137/65 156/77   Resp: 16 16 18   Temp: (!) 94.9  F (34.9  C) (!) 95  F (35  C) 96.4  F (35.8  C)   SpO2: 92% 95%          Electronically Signed By: MARU Moreno CRNA  April 22, 2019  1:54 PM

## 2019-04-22 NOTE — PLAN OF CARE
Discharge Planner PT   Patient plan for discharge: Home with wife to assist   Current status: Bed Mobility: Independent with bed rail   Transfers: CGA with FWW  Gait: Pt ambulated about 150ft with FWW and CGA. Good WB through RLE, step-through gait   Barriers to return to prior living situation: Still needs to try stairs, needs a walker  Recommendations for discharge: Home with wife   Rationale for recommendations: See PT eval        Entered by: Brii Gurrola 04/22/2019 2:18 PM

## 2019-04-22 NOTE — OP NOTE
Preoperative Diagnosis: Right Hip Osteoarthritis    Postoperative Diagnosis: Right Hip Osteoarthritis    Procedure: Right Direct Anterior Total Hip Arthroplasty    Surgeon: Neo Medel MD    Assistants: Rojas SÁNCHEZ    A skilled first assistant was required for patient positioning, retracting, and carrying out the procedure in a safe and effective manner    Anesthesia:   1) Spinal with Sedation  2) Local Injection around surgical site    Findings:    1) Satisfactory ALONSO with near equal restoration of leg length and offset postoperatively    2) Adequate hemostasis     Implants:    1) Smith [employer]hew Polar Femoral Stem Size 5 Standard Offset   2) Size 60 mm R3 Acetabular Shell   3) Size 36 mm standard poly acetabular insert   4) Size 36 mm +8 Oxinium Femoral Head      Estimated Blood Loss: 250 ml    Specimens: None    Drains: None    Complications: None    Indications:   This is a 84 year old patient with long standing right hip pain and severe osteoarthritis.  They have failed nonoperative treatment including use of NSAIDs; Tylenol; injections and exercise.  Given the continued symptoms they wished to proceed with a hip replacement.  The risks including pain, bleeding, infection, deep vein thrombosis, pulmonary embolism, myocardial infarction, component failure, need for revision operation was discussed with the patient.  The surgical consent was signed and surgical site was marked.  All questions regarding postoperative disposition were answered.      Description of Procedure:   The patient was taken to the operating room and placed under spinal anesthesia.  They were then moved to the [place] bed and positioned in standard fashion.  The C-arm was used to make a templating radiograph for post reconstruction comparison.  The Right hip was prepped with a Chloraprep wipe and sponge then draped in sterile fashion.  A timeout was called to identify the correct patient and surgical site.  An anterolateral incision and  direct anterior approach to the hip was utilized.  Care was taken to cauterize the circumflex vessels.  A capsulotomy was completed and tag sutures were placed.  The femoral neck was exposed and an osteotomy was completed.  A secondary cut was made to make removing the femoral head/neck easier.  The hip was externally rotated and the labrum excised.  Further release on the proximal femur was completed to improve visualization of the proximal femur later in the case.  The C-arm was used during reaming the acetabulum to check alignment and depth of reaming.  The acetabulum was reamed to a size 60 mm and the acetabular shell was placed.  A 6.5 mm bone screw 25 mm in length was placed along with a hole eliminator.  The final 36 mm poly insert was placed and checked for a secure fit.  The traction was released and standard capsular releases were completed on the proximal femur.  The leg was externally rotated 120 degrees and brought into adduction and extension.  Standard retractor placement was utilized.  The femur was prepared with a box osteotome, canal finder and broaching up to a size 5.  The trial implants were placed and the hip was reduced.  C-arm was used to compare to preoperative leg length and offset.  The final size 5 Femoral stem was placed with a size 36 +8 Oxinium femoral head and the hip was reduced.  Final C-arm images were used to confirm positioning.  No fractures were identified.  Pulse lavage and betadine irrigation was used.  The capsule was closed with #1 Vicryl sutures.  The tensor fascia was closed with a #1 Vicryl suture in running fashion.  The subcutaneous tissue was closed with a 2-0 Vicryl and staples. An Aquacel dressing was applied.  The patient was awakened and transferred to PACU in stable condition.  A post operative x-ray was taken in PACU.        Postoperative Plan:   Routine ALONSO protocol (Weightbearing as tolerated, No hip ROM precautions, PT, Pain control, DVT prophylaxis with  Aspirin).  Anticipate discharge in 1-2 days.  Follow-up in  2 weeks in [facility].  X-rays at follow-up AP and Cross Table Lateral Right Hip

## 2019-04-22 NOTE — PLAN OF CARE
Patient is tolerating clear liquid diet with no nausea and no difficulty, advanced to regular diet, patient encouraged to order light.

## 2019-04-22 NOTE — PLAN OF CARE
Face to face report given with opportunity to observe patient.    Report given to Willa Pretty RN  4/22/2019  3:24 PM

## 2019-04-22 NOTE — PLAN OF CARE
Patient medicated preemptively to keep ahead of his discomfort, patient did get up with assist of this writer using the transfer belt and walker in an attempt to help patient to void, to no avail, patient reported he has had an issue with post surgical urine retention. Will continue to encourage patient to void.

## 2019-04-22 NOTE — PROGRESS NOTES
[facility]    Hospitalist Progress Note    Date of Service (when I saw the patient): 04/22/2019    Assessment & Plan   Vinny Henry is a 84 year old  man who was admitted on 4/22/2019 after elective right total knee arthroplasty for severe degenerative arthritis.  He in fact has had pain in this hip now for a number of years and has not had a significant response to more conservative measures.  Prior left total hip arthroplasty now somewhat remote.  And other he suspects medical history is surprisingly sparse.  Details as below.  Cardiac history with the exception of mild arrhythmia with first-degree AV block and some dropped beats although subsequently he underwent urologic surgery without difficulty.  No myocardial dysfunction or infarct.  No respiratory difficulty.  He stopped smoking some time ago.  Resected pheochromocytoma.  Remote cholecystectomy.  Benign prostatic hypertrophy.  Lumbosacral radicular pain at L5.  Esophagogastroduodenoscopy with mild esophagitis and gastroesophageal reflux 2001.  Review of operative record shows unremarkable operative course so far.    1.  Right total hip arthroplasty, postoperative day 0  Has arrived from operating room.  No issues so far.  Block still in place.  Will reevaluate pain when effects of this wear off.  Anticipate usual postoperative orthopedic course.  Twice daily full dose aspirin for DVT prophylaxis.  Anticipate 4 weeks course.  2.  First-degree AV block  Not evident on EKG obtained preoperatively.  This was evident during his prior orthopedic course.  Even at that time there were no significant untoward events.  Clinical evaluation alone adequate.  No suggestion of any ischemia; has had some anterior T wave abdomen for some time.  3.  History of benign prostatic hypertrophy  We will need usual clinical observation and may be at somewhat increased risk of urinary retention perioperatively.     DVT Prophylaxis: Twice daily full dose  aspirin  Code Status: Full Code    Disposition: Expected discharge in 2-3 days depending on his course.  He anticipates outpatient rehabilitation and return home postoperatively.    Nasir DARIN Samuels    Interval History   Awake alert and interactive.  No dyspnea.  No other discomfort.      -Data reviewed today: I reviewed all new labs and imaging results over the last 24 hours. I personally reviewed EKG obtained preoperatively which shows sinus rhythm.  Anterior T wave abnormality.    Peripheral IV 04/22/19 Left Lower forearm (Active)   Site Assessment [facility] 4/22/2019  9:53 AM   Line Status Infusing 4/22/2019  9:53 AM   Phlebitis Scale 0-->no symptoms 4/22/2019  9:53 AM   Infiltration Scale 0 4/22/2019  7:25 AM   Infiltration Site Treatment Method  None 4/22/2019  7:25 AM   Extravasation? No 4/22/2019  7:25 AM   Dressing Intervention New dressing  4/22/2019  9:53 AM   Number of days: 0       Urethral Catheter 16 fr (Active)   Number of days: 1857       Incision/Surgical Site 12/13/13 Left Groin (Active)   Number of days: 1956       Incision/Surgical Site 04/22/19 Right Hip (Active)   Incision Assessment [facility] 4/22/2019 10:24 AM   Closure Approximated;Staples 4/22/2019  9:25 AM   Incision Drainage Amount None 4/22/2019 10:24 AM   Dressing Intervention Clean, dry, intact 4/22/2019 10:24 AM   Number of days: 0     Line/device assessment(s) completed for medical necessity    Physical Exam   Temp: (!) 95.4  F (35.2  C) Temp src: Tympanic BP: 115/58   Heart Rate: 61 Resp: 16 SpO2: 93 % O2 Device: None (Room air) Oxygen Delivery: 2 LPM  Vitals:    04/22/19 0722   Weight: 95.3 kg (210 lb)     Vital Signs with Ranges  Temp:  [95.4  F (35.2  C)-97.2  F (36.2  C)] 95.4  F (35.2  C)  Heart Rate:  [58-63] 61  Resp:  [10-18] 16  BP: ()/(40-97) 115/58  SpO2:  [91 %-99 %] 93 %  No intake/output data recorded.    Awake, alert, pleasant interactive man lying in bed on medical wards.  HEENT: Pupils equal, conjugate. No icterus or  nystagmus. Oral mucosa moist. No facial asymmetry.   Neck: Supple, jugular veins not elevated. Trachea midline   Chest: No chest wall movement asymmetry. Aeration preserved to bases.. Accessory muscles not in use. Expiratory time not increased. No tidal wheezes. No rhonchi. No discrete crackles.   Cardiac: PMI not displaced. S1, S2 unremarkable. No S3, S4. P2 not accentuated. No murmurs. Carotid upstroke preserved. Carotid amplitude preserved.   Abdomen: Soft. No palpation or percussion tenderness. No distention. Normoactive bowel sounds. Liver and spleen not increased in size. No bruits, masses, or pulsations.   Extremities: No lower extremity edema.  Warm distally.  Brisk capillary refill.  No eccymoses, clubbing.   Neurologic: Mental state above.  Moves all extremities equally.. Tone preserved. No fasiculations or tremors.  Sensation to light touch minimally diminished to left lower extremity (block still active). DTR 1-2/4 and bilaterally equal.     Medications     No Medication Sleep Aids for this Patient       sodium chloride         [START ON 4/23/2019] aspirin  325 mg Oral BID     ceFAZolin  2 g Intravenous Q8H     [START ON 4/23/2019] finasteride  5 mg Oral Daily     nicotine  1 patch Transdermal Q24H     nicotine   Transdermal Q8H     [START ON 4/23/2019] nicotine   Transdermal Daily     senna-docusate  1 tablet Oral BID    Or     senna-docusate  2 tablet Oral BID     sodium chloride (PF)  3 mL Intracatheter Q8H           Data   No lab results found in last 7 days.         Recent Results (from the past 24 hour(s))   XR Surgery BRENDA Fluoro L/T 5 Min w Stills    Narrative    This exam was marked as non-reportable because it will not be read by a   radiologist or a [place] non-radiologist provider.             XR Pelvis Port 1/2 Views    Narrative    PROCEDURE: XR PELVIS PORT 1/2 VW 4/22/2019 9:50 AM    HISTORY: AP Pelvis Only; Status post ALONSO    COMPARISONS: None.    TECHNIQUE: 2 AP views.    FINDINGS:  There are bilateral hip arthroplasties with components in  satisfactory position.    No acute abnormality is seen.         Impression    IMPRESSION: Bilateral total hip arthroplasties.    DEREK LAWSON MD

## 2019-04-22 NOTE — PLAN OF CARE
[facility] Inpatient Admission Note:    Patient admitted to 3112/3112-1 at approximately 1044 via bed accompanied by staff from surgery . Report received from Stalin PARMAR in SBAR format at 1044 via face to face in room. Patient aditted to floor via surgical bed to bed via N/A. Patient is alert and oriented X 3, denies pain; rates at 0 on 0-10 scale.  Patient oriented to room, unit, hourly rounding, and plan of care. Explained admission packet and patient handbook with patient bill of rights brochure. Will continue to monitor and document as needed.     Inpatient Nursing criteria listed below was met:    Health care directives status obtained and documented: Yes    Care Everywhere authorization obtained N/A    MRSA swab completed for patient 65 years and older: Yes    Patient identifies a surrogate decision maker: Yes If yes, who:wife jackie Contact Information: see face sheet     Core Measure diagnosis present:N/A.      If initial lactic acid >2.0, repeat lactic acid drawn within one hour of arrival to unit: NA. If no, state reason: N/A    Vaccination assessment and education completed: Yes   Vaccinations received prior to admission: Pneumovax yes  Influenza(seasonal)  N/A   Vaccination(s) ordered: not given today because patient declined    Clergy visit ordered if patient requests: N/A    Skin issues/needs documented: Yes    Isolation Patient: no Education given, correct sign in place and documentation row added to PCS:  No    Fall Prevention Yes: Care plan updated, education given and documented, sticker and magnet in place: Yes    Care Plan initiated: Yes    Education Documented (including assessment): Yes    Patient has discharge needs : No If yes, please explain:N/A

## 2019-04-22 NOTE — PROGRESS NOTES
Inpatient Physical Therapy Evaluation    Name: Vinny Henry MRN# [id number]   Age: 84 year old YOB: 1935     Date of Consultation: 2019  Consultation is requested by:  Dr. Medel  Specific Consultation Request:  Post op total hip   Primary care provider: Johanna Duran    General Information:     Onset Date: 19    History of Current Problem/Admission: DJD RIGHT HIP  Status post right hip replacement    Prior Level of Function: Pt reports he was independent with all mobility prior to surgery without assistive device. Reports he had his other hip replaced about 7 yrs ago.   Ambulation: 0 - Independent   Transferrin - Independent   Toiletin - Independent    Bathin - Independent   Dressin - Independent   Eatin - Independent   Communication: 0 - Understands/communicates without difficulty  Swallowin - swallows foods/liquids without difficulty  Cognition: 0 - no cognitive issues reported    Fall history within the last 6 months: No    Current Living Situation: Pt lives at home with his wife. Patient has 3 stairs to enter the home and 3 stairs to get to his kitchen without railings     Current Equipment Used at Home: None    Patient & Family Goals: Return home     Past Medical History:   History reviewed. No pertinent past medical history.    Past Surgical History:  Past Surgical History:   Procedure Laterality Date     ARTHROPLASTY HIP      left       Medications:   Current Facility-Administered Medications   Medication     acetaminophen (TYLENOL) tablet 650-975 mg     [START ON 2019] aspirin (ASA) EC tablet 325 mg     benzocaine-menthol (CEPACOL) 15-3.6 MG lozenge 1-2 lozenge     ceFAZolin (ANCEF) intermittent infusion 2 g in 100 mL dextrose PRE-MIX     [START ON 2019] finasteride (PROSCAR) tablet 5 mg     HYDROmorphone (PF) (DILAUDID) injection 0.3-0.5 mg     hydrOXYzine (ATARAX) tablet 10 mg     [START ON 2019] lidocaine (LMX4) kit      [START ON 2019] lidocaine 1 % 0.1-1 mL     metoclopramide (REGLAN) tablet 5 mg    Or     metoclopramide (REGLAN) injection 5 mg     naloxone (NARCAN) injection 0.1-0.4 mg     nicotine (NICODERM CQ) 7 MG/24HR 24 hr patch 1 patch     nicotine Patch in Place     [START ON 2019] nicotine patch REMOVAL     No Medication Sleep Aids for this Patient     ondansetron (ZOFRAN-ODT) ODT tab 4 mg    Or     ondansetron (ZOFRAN) injection 4 mg     oxyCODONE (ROXICODONE) tablet 5-10 mg     prochlorperazine (COMPAZINE) injection 5 mg    Or     prochlorperazine (COMPAZINE) tablet 5 mg     senna-docusate (SENOKOT-S/PERICOLACE) 8.6-50 MG per tablet 1 tablet    Or     senna-docusate (SENOKOT-S/PERICOLACE) 8.6-50 MG per tablet 2 tablet     sodium chloride (PF) 0.9% PF flush 3 mL     sodium chloride (PF) 0.9% PF flush 3 mL     sodium chloride 0.9% infusion       Weight Bearing Status: WBAT     Cognitive Status Examination:  Orientation: awake and alert  Level of Consciousness: alert  Follows Commands and Answers Questions: 100% of the time  Personal Safety and Judgement: Intact  Memory: Immediate recall intact  Comments:     Pain:   Currently in pain? Yes  Pain Location? right hip  Pain Ratin    Physical Therapy Evaluation:   Integumentary/Edema: No issues observed  ROM: R hip ROM not assessed  Strength: Good quad activation, fair quad strength  Bed Mobility: Independent with bed rail   Transfers: CGA with FWW  Gait: Pt ambulated about 150ft with FWW and CGA. Good WB through RLE, step-through gait   Stairs: NA  Balance: Fair  Sensory: No issues observed  Coordination: No issues observed    Physical Therapy Interventions: Balance, Bed Mobility, Gait Training , ROM, Strengthening, Transfer Training and Home Programming Guidelines    Clinical Impressions:    Clinical Impressions:  Criteria for Skilled Therapeutic Intervention Met: Yes, treatment indicated  PT Diagnosis: s/p R ALONSO  Influenced by the following impairments:  R hip  pain, impaired gait, decreased activity tolerance, weakness  Functional limitations due to impairment: Decreased level of independence, decreased tolerance for daily activities, increased risk for falls  Clinical presentation: Stable/Uncomplicated  Clinical presentation rationale: Clinical judgement  Clinical Decision making (complexity): Low Complexity  Frequency: 2 times/day   Predicted Duration of Therapy Intervention (days/wks): 5 days    Anticipated Discharge Disposition: Home with Assist  Anticipated Equipment Needs at Discharge: front wheeled walker  Risks and Benefits of therapy have been explained: Yes  Patient, Family & other staff in agreement with plan of care: Yes  Clinical Impression Comments: Pt doing very well POD 0 s/p R ALONSO. Pt plans to discharge home with wife to assist as needed. Pt will need a walker prior to discharge home. Plan to treat pt 2x/day during his stay.     Total Eval Time: 15

## 2019-04-22 NOTE — ANESTHESIA PROCEDURE NOTES
Peripheral nerve/Neuraxial procedure note : lumbar puncture  Pre-Procedure  Performed by   Referred by DR. BARBOZA  Location: OR    Procedure Times:4/22/2019 7:35 AM and 4/22/2019 7:43 AM  Pre-Anesthestic Checklist: patient identified, IV checked, site marked, risks and benefits discussed, informed consent, monitors and equipment checked, pre-op evaluation, at physician/surgeon's request and post-op pain management    Timeout  Correct Patient: Yes   Correct Procedure: Yes   Correct Site: Yes   Correct Laterality: N/A   Correct Position: Yes   Site Marked: N/A   .   Procedure Documentation  ASA 3  .    Procedure:    Lumbar puncture.  Insertion Site:L3-4  (midline approach)      Patient Prep;povidone-iodine 7.5% surgical scrub.  .  Needle: Joey tip Spinal Needle (gauge): 25  Spinal/LP Needle Length (inches): 3.5 # of attempts: 1 and # of redirects:  Introducer used Introducer: 20 G .       Assessment/Narrative  Paresthesias: No.  .  .  clear CSF fluid removed . Time Injected: 07:40

## 2019-04-23 ENCOUNTER — APPOINTMENT (OUTPATIENT)
Dept: PHYSICAL THERAPY | Facility: HOSPITAL | Age: 84
DRG: 470 | End: 2019-04-23
Attending: ORTHOPAEDIC SURGERY
Payer: MEDICARE

## 2019-04-23 ENCOUNTER — APPOINTMENT (OUTPATIENT)
Dept: OCCUPATIONAL THERAPY | Facility: HOSPITAL | Age: 84
DRG: 470 | End: 2019-04-23
Attending: ORTHOPAEDIC SURGERY
Payer: MEDICARE

## 2019-04-23 LAB
BACTERIA SPEC CULT: NORMAL
COPATH REPORT: NORMAL
HGB BLD-MCNC: 10.7 G/DL (ref 13.3–17.7)
SPECIMEN SOURCE: NORMAL

## 2019-04-23 PROCEDURE — A9270 NON-COVERED ITEM OR SERVICE: HCPCS | Performed by: ORTHOPAEDIC SURGERY

## 2019-04-23 PROCEDURE — 36415 COLL VENOUS BLD VENIPUNCTURE: CPT | Performed by: ORTHOPAEDIC SURGERY

## 2019-04-23 PROCEDURE — 97530 THERAPEUTIC ACTIVITIES: CPT | Mod: GP | Performed by: PHYSICAL THERAPIST

## 2019-04-23 PROCEDURE — 99231 SBSQ HOSP IP/OBS SF/LOW 25: CPT | Performed by: INTERNAL MEDICINE

## 2019-04-23 PROCEDURE — 25000128 H RX IP 250 OP 636: Performed by: ORTHOPAEDIC SURGERY

## 2019-04-23 PROCEDURE — 85018 HEMOGLOBIN: CPT | Performed by: ORTHOPAEDIC SURGERY

## 2019-04-23 PROCEDURE — 97166 OT EVAL MOD COMPLEX 45 MIN: CPT | Mod: GO

## 2019-04-23 PROCEDURE — 25000132 ZZH RX MED GY IP 250 OP 250 PS 637: Performed by: ORTHOPAEDIC SURGERY

## 2019-04-23 PROCEDURE — 97110 THERAPEUTIC EXERCISES: CPT | Mod: GP | Performed by: PHYSICAL THERAPIST

## 2019-04-23 PROCEDURE — 40000275 ZZH STATISTIC RCP TIME EA 10 MIN

## 2019-04-23 PROCEDURE — 12000000 ZZH R&B MED SURG/OB

## 2019-04-23 RX ORDER — OXYCODONE HYDROCHLORIDE 5 MG/1
5-10 TABLET ORAL EVERY 4 HOURS PRN
Qty: 40 TABLET | Refills: 0 | Status: SHIPPED | OUTPATIENT
Start: 2019-04-23 | End: 2024-06-03

## 2019-04-23 RX ORDER — ASPIRIN 325 MG
325 TABLET, DELAYED RELEASE (ENTERIC COATED) ORAL
Qty: 30 TABLET | Refills: 0 | Status: SHIPPED | OUTPATIENT
Start: 2019-04-23 | End: 2024-06-03

## 2019-04-23 RX ADMIN — ONDANSETRON 4 MG: 2 INJECTION INTRAMUSCULAR; INTRAVENOUS at 13:14

## 2019-04-23 RX ADMIN — FINASTERIDE 5 MG: 5 TABLET, FILM COATED ORAL at 09:26

## 2019-04-23 RX ADMIN — ASPIRIN 325 MG: 325 TABLET, COATED ORAL at 16:07

## 2019-04-23 RX ADMIN — OXYCODONE HYDROCHLORIDE 10 MG: 5 TABLET ORAL at 14:23

## 2019-04-23 RX ADMIN — SENNOSIDES AND DOCUSATE SODIUM 1 TABLET: 8.6; 5 TABLET ORAL at 09:26

## 2019-04-23 RX ADMIN — SENNOSIDES AND DOCUSATE SODIUM 1 TABLET: 8.6; 5 TABLET ORAL at 20:38

## 2019-04-23 RX ADMIN — OXYCODONE HYDROCHLORIDE 10 MG: 5 TABLET ORAL at 05:54

## 2019-04-23 RX ADMIN — OXYCODONE HYDROCHLORIDE 10 MG: 5 TABLET ORAL at 10:24

## 2019-04-23 RX ADMIN — ASPIRIN 325 MG: 325 TABLET, COATED ORAL at 09:25

## 2019-04-23 RX ADMIN — ACETAMINOPHEN 650 MG: 325 TABLET, FILM COATED ORAL at 09:46

## 2019-04-23 RX ADMIN — OXYCODONE HYDROCHLORIDE 10 MG: 5 TABLET ORAL at 18:44

## 2019-04-23 ASSESSMENT — ACTIVITIES OF DAILY LIVING (ADL)
ADLS_ACUITY_SCORE: 14
ADLS_ACUITY_SCORE: 14
ADLS_ACUITY_SCORE: 18
ADLS_ACUITY_SCORE: 14

## 2019-04-23 NOTE — PLAN OF CARE
VSS, afebrile, HRR, lungs clear, Pt on 3L of O2 to keep sat >92% overnight, since his has been awake this morning I have been weaning him. Pt is currently 91% on 1L via NC, will increase to 2L.  Bowels active, pt states his pain is a 4/10 this morning in his right hip, pt given oxycodone 10 mg. Dressing is CDI, CMS intact. Pt is alert and oriented x 4, makes his needs known, up with a walker and gait belt, standby assist.

## 2019-04-23 NOTE — PROGRESS NOTES
Inpatient Occupational Therapy Evaluation    Name: Vinny Henry MRN# [id number]   Age: 84 year old YOB: 1935     Date of Consultation: 2019  Consultation is requested by:  Dr. Medel  Specific Consultation Request:  Post-op Total Hip Evaluation  Primary care provider: Johanna Duran    General Information:   Onset Date: 19    History of Current Problem/Admission: DJD RIGHT HIP  Status post right hip replacement    Prior Level of Function: Pt was mostly independent in ADLs, but his wife did assist with his socks frequently. Pt also has a sock aid that he'd use if he donned his socks himself. At the end of the eval, pt then stated he managed his socks mostly.   Ambulation: 0 - Independent   Transferrin - Independent   Toiletin - Independent    Bathin - Independent  Dressin - Assistive Equipment (reacher and sock aid) AND 2 - Assistive Person  Eatin - Independent  Communication: 0 - Understands/communicates without difficulty  Swallowin - swallows foods/liquids without difficulty  Cognition: 0 - no cognitive issues reported    Fall history within the last 6 months: No    Current Living Situation: Pt lives at home with his wife. 2-3 steps to enter home and then another 3 steps to the kitchen. There is a basement with a full flight of stairs, where laundry is located. Pt will mainly be using his bedroom bathroom, which has a  raised toilet and a walk in shower; no grab bars. The other bathroom on the main level has grab bars near the toilet.     Current Equipment Used at Home: Sock aid, reacher    Patient & Family Goals: Return home with wife     Past Medical History:   History reviewed. No pertinent past medical history.    Past Surgical History:  Past Surgical History:   Procedure Laterality Date     ARTHROPLASTY HIP      left       Medications:   Current Facility-Administered Medications   Medication     acetaminophen (TYLENOL) tablet 650-975 mg      aspirin (ASA) EC tablet 325 mg     benzocaine-menthol (CEPACOL) 15-3.6 MG lozenge 1-2 lozenge     finasteride (PROSCAR) tablet 5 mg     HYDROmorphone (PF) (DILAUDID) injection 0.3-0.5 mg     hydrOXYzine (ATARAX) tablet 10 mg     [START ON 4/26/2019] lidocaine (LMX4) kit     [START ON 4/26/2019] lidocaine 1 % 0.1-1 mL     metoclopramide (REGLAN) tablet 5 mg    Or     metoclopramide (REGLAN) injection 5 mg     naloxone (NARCAN) injection 0.1-0.4 mg     nicotine (NICODERM CQ) 7 MG/24HR 24 hr patch 1 patch     nicotine Patch in Place     nicotine patch REMOVAL     No Medication Sleep Aids for this Patient     ondansetron (ZOFRAN-ODT) ODT tab 4 mg    Or     ondansetron (ZOFRAN) injection 4 mg     oxyCODONE (ROXICODONE) tablet 5-10 mg     prochlorperazine (COMPAZINE) injection 5 mg    Or     prochlorperazine (COMPAZINE) tablet 5 mg     senna-docusate (SENOKOT-S/PERICOLACE) 8.6-50 MG per tablet 1 tablet    Or     senna-docusate (SENOKOT-S/PERICOLACE) 8.6-50 MG per tablet 2 tablet     sodium chloride (PF) 0.9% PF flush 3 mL     sodium chloride (PF) 0.9% PF flush 3 mL     sodium chloride 0.9% infusion       Weight Bearing Status: WBAT     Cognitive Status Examination:  Orientation: awake and alert and oriented to time, place and person. Did incorrectly state the date but pt was only off by one day  Level of Consciousness: alert  Follows Commands and Answers Questions: 75% of the time  Personal Safety and Judgement: Impaired  Memory: Short-term memory impaired  Comments: Pt had some inconsistencies with history intake, including how he initially stated his wife mainly dons his socks but later stated he mainly completes it. Pt also needed repetitive instructions for tasks, but this could also be due to pt being [place] as he asked clinician to repeat questions occasionally. Pt instructed in safety tips in the bathroom, which he declined to use. Also, pt had a very uncontrolled descend when transferring stand > sit to the chair.      Pain:   Currently in pain? Yes  Pain Location? right hip  Pain Rating: 3 (prior to activity, pt rated pain 2-4/10. With activity, pt rated pain 4/10. After activity, pain rating was 0-1/10.     Occupational Therapy Evaluation:   Integumentary/Edema: No significant findings    Range of Motion: BUE's WFL   Strength: BUE's grossly 4+/5  Hand Strength: Bilateral gross grasp good   Muscle Tone Assessment: No deficits   Coordination: Normal     Mobility:   Bed Mobility: Pt transferred supine>sit EOB with SBA, using the bed rail, with moderate difficulties   Transfer Skills: CGA with FWW  Sit to Stand: CGA with FWW   Toilet Transfer: Close CGA and use of grab bars   Balance: Fair     ADLs:   Lower Body Dressing: Modified independent doffing socks using the floor to slide socks off. Pt used the sock aid to don his socks, with Mariana to pull up fully and verbal cues for tips/techniques using the sock aid.   Grooming: SBA standing at the sink to brush his dentures    IADLs:   Previous Responsibilities of the Patient: Housekeeping, Laundry, Shopping, Yard Work, Medication Management, and Driving   Comments: Pt does some light housekeeping (in room) otherwise his wife does big/most cleaning. Pt assists with transporting laundry up/down stairs (wife actually completes the laundry). Pt's daughter completes their shopping but pt and his wife go for paper products occasionally.     Activities of Daily Living Analysis:   Impairments Contributing to Impaired Activities of Daily Living: Balance impaired , Cognition impaired, pain, strength decreased, and activity tolerance decreased  Comments:     Occupational Therapy Interventions: ADL Retraining, balance retraining, bed mobility, cognition, ROM, strengthening, transfer training, and progressive activity/exercise    Clinical Impressions:  Criteria for Skilled Therapeutic Intervention Met: Yes, treatment indicated  OT Diagnosis: R ALONSO  Influenced by the following impairments: Pain,  impaired balance, impaired activity tolerance, weakness, cognitive deficits   Functional limitations due to impairment: Dressing, functional mobility, bathing, toileting/toilet hygiene, housekeeping, yard work, safety  Clinical presentation: Evolving/Changing  Clinical presentation rationale: Clinical judgement  Clinical Decision making (complexity): Moderate Complexity  Frequency: 5 times/week  Predicted Duration of Therapy Intervention (days/wks): 5 days  Anticipated Discharge Disposition: Home with Assist vs Home with Outpatient Therapy vs Transitional Care Facility  Anticipated Equipment Needs at Discharge: N/A  Risks and Benefits of therapy have been explained: Yes  Patient, Family & other staff in agreement with plan of care: Yes  Clinical Impression Comments: Pt required some assistance in regards to ADLs and functional mobility during the OT eval today. He also exhibited some cognitive impairments, as he required repeat instructions/questions, but it was also hard to determine if any was related to hearing deficits. Pt's PLOF, especially for LB dressing was also inconsistent. Pt's plan is to return home with his wife's assistance, and she completes many IADLs. Wife not present during eval, though, to ensure information pt reported was correct. Plan to treat pt during his hospital stay and will determine tomorrow if pt continues to exhibit cognitive deficits or if this is improved and pt exhibits increased independence in ADLs, mobility, and safety.     Total Eval Time: 30

## 2019-04-23 NOTE — PROGRESS NOTES
Assessment completed see flow sheet.    LOC: alert   Others present: Patient     Dx: RTHA    Lives with: spouse, Diyva  Living at:  Home in [place]  Transportation: YES, em drives and his daughter Clarissa does as well    Primary PCP: Johanna Duran  Insurance:  Medicare/ BCBS  Medicare Inpatient  letter reviewed with Em.    Support System:  family  Homecare/PCA: no  /County Services:   no  [place]: YES      [facility] Referral line called:     Health Care Directive: NO, has at home  Guardian: no  POA: no    Pharmacy: [facility]  Meds management: YES     Adequate Resources for needs (housing, utilities, food/med): YES  Household chores: helps  Work/community/social activity: NO, states is a home body    ADLs: independent  Ambulation:independent  Falls: no  Nutrition: no concern  Sleep: once he falls asleep he sleeps well     Equipment used: cane, raised toilet, grab bars      Oxygen supplier: n/a      Does the supplier have valid oxygen orders: n/a    Mental health: no  Substance abuse: no  Exposure to violence/abuse: no  Stressors: denies    Able to Return to Prior Living Arrangements: YES    Choice of Vendor: n/s    Barriers: CAL    OSIRIS: low    Plan: home with Divya, is contemplating home PT

## 2019-04-23 NOTE — PROGRESS NOTES
Subjective: The patient is POD #1 s/p Right Total Hip Arthroplasty.  The patients pain is controlled fairly well.  They deny shortness of breath, chest pain, nausea or vomiting.  There have been no acute perioperative complications    Objective:   B/P: 102/56, Temp: 98.1, Pulse: Data Unavailable, Resp: 20    Alert and Orientated x3; No Acute Distress; Appears comfortable     Hip Exam: dressing/wound is clean, dry, intact without significant drainage.  No erythema or signs of infection.     No evidence of DVT    Distal motor/sensory exam is intact in the superficial peroneal, deep peroneal and tibial nerve distributions.      Normal capillary refill with a palpable dorsalis pedis pulse.    Hemoglobin   Date Value Ref Range Status   04/23/2019 10.7 (L) 13.3 - 17.7 g/dL Final       Assessment/Plan:     1) POD # 1 S/P Right Total Hip Arthroplasty  -Pain controlled  -PT/OT--- Strengthening and Gait training  -WBAT; No Hip ROM Precautions  -DVT prophylaxis  -Hgb stable  -Dispo--To Home when cleared Medically and by PT---probably tomorrow  -Follow-Up in [facility] in 2 weeks  -Hospitalist co-management

## 2019-04-23 NOTE — PROGRESS NOTES
[facility]    Hospitalist Progress Note    Date of Service (when I saw the patient): 04/23/2019    Assessment & Plan   Vinny Henry is a 84 year old male who was admitted on 4/22/2019 for post op cares R ALONSO.    POD#1 elective R ALONSO: pain controlled, doing well.  - post op cares per ortho    Tobacco dependence: nicotine replacement    BPH: continue finasteride     DVT Prophylaxis: Defer to primary service    Code Status: Full Code    Disposition: Medically clear for discharge, discharge per ortho.    Raymundo Pradhan MD      Interval History   Patient seen in room, doing well.  Patient was on supplemental O2 with 97% sat.  O2 removed.  No cp, sob, abdominal pain.  Surgical pain controlled.    -Data reviewed today: I reviewed all new labs and imaging results over the last 24 hours.    Physical Exam   Temp: 98.1  F (36.7  C) Temp src: Tympanic BP: 102/56   Heart Rate: 62 Resp: 20 SpO2: 95 % O2 Device: Nasal cannula Oxygen Delivery: 2 LPM(turned to 1)  Vitals:    04/22/19 0722   Weight: 95.3 kg (210 lb)     Vital Signs with Ranges  Temp:  [94.9  F (34.9  C)-99.5  F (37.5  C)] 98.1  F (36.7  C)  Heart Rate:  [59-79] 62  Resp:  [10-20] 20  BP: ()/(40-77) 102/56  SpO2:  [89 %-99 %] 95 %    Intake/Output Summary (Last 24 hours) at 4/23/2019 0851  Last data filed at 4/23/2019 0500  Gross per 24 hour   Intake 3919 ml   Output 1800 ml   Net 2119 ml       Peripheral IV 04/22/19 Left Lower forearm (Active)   Site Assessment WDL 4/23/2019  5:53 AM   Line Status Saline locked 4/23/2019  5:53 AM   Phlebitis Scale 0-->no symptoms 4/23/2019  5:53 AM   Infiltration Scale 0 4/23/2019  5:53 AM   Infiltration Site Treatment Method  None 4/22/2019  7:25 AM   Extravasation? No 4/22/2019  7:25 AM   Dressing Intervention New dressing  4/22/2019  9:53 AM   Number of days: 1       Urethral Catheter 16 fr (Active)   Number of days: 1858       Rash 04/22/19 1122 Right other (see comments) (Active)   Distribution generalized  4/22/2019 11:01 AM   Characteristics edema 4/22/2019 11:01 AM   Color red 4/22/2019 11:01 AM   Care, Rash open to air 4/22/2019 11:01 AM   Number of days: 1       Incision/Surgical Site 12/13/13 Left Groin (Active)   Number of days: 1957       Incision/Surgical Site 04/22/19 Right Hip (Active)   Incision Assessment [facility] 4/22/2019 11:45 PM   Misty-Incision Assessment [facility] 4/22/2019 11:45 PM   Closure Approximated;Staples 4/22/2019  9:25 AM   Incision Drainage Amount [facility] 4/22/2019 11:45 PM   Drainage Description [facility] 4/22/2019 11:45 PM   Dressing Intervention Clean, dry, intact 4/22/2019 11:45 PM   Number of days: 1     No line/device    Constitutional - AA, NAD  HEENT - atraumatic, normocephalic  Neck - supple, no masses, no JVD  CVS - S1 S2 RRR, no murmurs, rubs, gallops  Respiratory - CTA b/l  GI - soft, NT, ND, + bowel sounds, no organomegaly  Musculoskeletal - no LE edema, no lesions  Neuro - oriented x 3, no gross focal deficits    Medications     No Medication Sleep Aids for this Patient       sodium chloride Stopped (04/23/19 0000)       aspirin  325 mg Oral BID     finasteride  5 mg Oral Daily     nicotine  1 patch Transdermal Q24H     nicotine   Transdermal Q8H     nicotine   Transdermal Daily     senna-docusate  1 tablet Oral BID    Or     senna-docusate  2 tablet Oral BID     sodium chloride (PF)  3 mL Intracatheter Q8H       Data   Recent Labs   Lab 04/23/19  0512   HGB 10.7*          Recent Results (from the past 24 hour(s))   XR Surgery BRENDA Fluoro L/T 5 Min w Stills    Narrative    This exam was marked as non-reportable because it will not be read by a   radiologist or a [place] non-radiologist provider.             XR Pelvis Port 1/2 Views    Narrative    PROCEDURE: XR PELVIS PORT 1/2 VW 4/22/2019 9:50 AM    HISTORY: AP Pelvis Only; Status post ALONSO    COMPARISONS: None.    TECHNIQUE: 2 AP views.    FINDINGS: There are bilateral hip arthroplasties with components in  satisfactory position.    No acute  abnormality is seen.         Impression    IMPRESSION: Bilateral total hip arthroplasties.    MD Raymundo JAMES MD

## 2019-04-23 NOTE — PLAN OF CARE
"Reason for hospital stay:  Right Hip Replacement    Most recent vitals: /64   Temp 97.7  F (36.5  C) (Temporal)   Resp 17   Ht 1.778 m (5' 10\")   Wt 95.3 kg (210 lb)   SpO2 (!) 67%   BMI 30.13 kg/m    Pain Management:  C/o 4/10 pain, administered Emmy x2 with decrease to maintain comfort with activity  Orientation:  A&O, [place]  Cardiac:  HR reg  Respiratory:  O2 de-sat to 66% while sleeping placed on 3 LPM O2 to maintain 90%   GI:  BS active. Nausea and emesis x1 this shift, administered Zofran with relief.  :  Voids without difficulty  Diet: Reg diet  Skin Issues:  Incision to right hip, AquaCell in place with no noted shadowed. Good cms, palpable pulse, denies numbness  IVF:  IV SL  ABX:  None, Completed previous shift  Mobility:  SBA with walker. PT/OT worked with this shift  Safety:  Call light within reach. Bed alarm active    Comments:    4/23/2019  3:09 PM  Deborah Rios    Face to face report given with opportunity to observe patient.    Report given to AGATA Tyler   4/23/2019  3:38 PM    "

## 2019-04-23 NOTE — PLAN OF CARE
Discharge Planner OT   Patient plan for discharge: Home with wife  Current status: CGA for sit/stand transfers from bed and toilet, SBA for bed mobility using grab bar but with moderate difficulties, Mod(I) doffing socks but Mariana re-donning using sock aide also with verbal cues, SBA standing at the sink to brush his dentures  Barriers to return to prior living situation: Pain, impaired cognition, impaired balance, impaired activity tolerance, weakness, safety concerns   Recommendations for discharge: TBD Home with assist vs Home with Outpatient Therapy vs TCU  Rationale for recommendations: Pt required some assistance in regards to ADLs and functional mobility during the OT eval today. He also exhibited some cognitive impairments, as he required repeat instructions/questions, but it was also hard to determine if any was related to hearing deficits. Pt's PLOF, especially for LB dressing was also inconsistent. Pt's plan is to return home with his wife's assistance, and she completes many IADLs. Wife not present during eval, though, to ensure information pt reported was correct. Plan to treat pt during his hospital stay and will determine tomorrow if pt continues to exhibit cognitive deficits or if this is improved and pt exhibits increased independence in ADLs, mobility, and safety.        Entered by: Lisa Amador 04/23/2019 1:30 PM

## 2019-04-23 NOTE — PLAN OF CARE
Discharge Planner PT   Patient plan for discharge: Home with wife with OP PT  Current status: Patient was agreeable to participate in skilled PT this morning. He was somewhat impulsive and required significant cueing on safety as he liked to stand up without warning and plop down uncontrolled in the chair as well. Performed sit to stands with CGA and cueing on safety. Ambulated 200 ft with CGA and cueing on ambulation form. Performed up and down 4 stairs with continued cueing on safety x 4 until he performed them correctly. Patient overall did well in session, however his safety was concerning and he needs to be reminded to slow down and move with care quite often  Barriers to return to prior living situation: Safety concerns  Recommendations for discharge: Home with OP PT  Rationale for recommendations: Due to safety concerns       Entered by: Gardenia Alvarez 04/23/2019 1:54 PM

## 2019-04-23 NOTE — PLAN OF CARE
Discharge Planner PT   Patient plan for discharge: Home with wife  Current status: Reviewed supine HEP as patient had some bouts of nausea this afternoon - perforned heel slides, ankle pumps, quad sets, glute sets x 10 reps with 3 sec holds with cueing. Discussed safety once again with patient  Barriers to return to prior living situation: Safety with mobility tasks  Recommendations for discharge: Home with wife  Rationale for recommendations: Due to current functional status       Entered by: Gardenia Alvarez 04/23/2019 2:48 PM

## 2019-04-23 NOTE — PLAN OF CARE
Face to face report given with opportunity to observe patient.    Report given to Janae WILLIS  4/23/2019  12:00 PM

## 2019-04-23 NOTE — PLAN OF CARE
Pt A&O, on ra with SATS 90%, lungs dim in the bases with cough and deep breathing encouraged.  No fevers. 325 mg asa given and SCD's on and working.  CMS intact.  Incisional dressing is C,D,I.  Pt up in chair for breakfast-good appetite.  Walked to BR with walker and asst x1 with  c/o pain.  No narcotics available PRN at that time, gave oral Tylenol.  Pt makes needs known,  Alarms in use and call light in reach.    Face to face report given with opportunity to observe patient.    Report given to Deborah Rogers   4/23/2019  12:38 PM

## 2019-04-23 NOTE — DISCHARGE INSTRUCTIONS
*Appointments:    You have a Follow-up Appointment scheduled for Wednesday May 8th at 9:30am with  at [facility] if you need to reschedule please call [phone]

## 2019-04-23 NOTE — PLAN OF CARE
Face to face report given with opportunity to observe patient.    Report given to AGATA Cardoso and [facility] Nursing Student.     Rafal Valenzuela   4/23/2019  7:52 AM

## 2019-04-23 NOTE — PLAN OF CARE
Patient is doing well. He continues to c/o right hip pain, he's been rating pain 5-6/10. Roxicodone 5 mg PO was given per patient's request. Shortly after, patient hadn't had enough pain relief so Tylenol 975 mg PO was given. Patient also changed positions. He has reported that his pain has been tolerable when he is standing. Roxicodone 10 mg PO given towards the end of the shift. Patient has been encouraged to stay on top of pain by regularly taking pain medication through the night. VSS initially, low grade temp of 99.5 the second half of the shift. O2 sat also decreased towards the end of the shift, down to 89% on room air. O2 placed and titrated to maintain sat >/= 92%. O2 @ 2.5 LPM NC. Lung sounds were diminished in the right with fine crackles in bilateral bases. Patient has been encouraged to deep breathe and cough frequently, use his IS. Right hip dressing remains clean, dry and intact. CMS + RLE. Patient denies numbness or tingling. Ice pack has been in place. Patient was up in the chair for the first couple hours of the shift. Oral intake adequate. He ambulated to the bathroom twice this shift. Voiding without difficulty after being straight catheterized. Remains assist of 1 with gait belt and walker. He is walking into the bathroom. Alarms in place. Call light within reach. IVF decreased to 50 ml/hour because oral fluid intake is fair. IV antibiotic continues.    Face to face report given with opportunity to observe patient.    Report given to Rafal Garcia   4/22/2019  11:26 PM

## 2019-04-24 ENCOUNTER — APPOINTMENT (OUTPATIENT)
Dept: OCCUPATIONAL THERAPY | Facility: HOSPITAL | Age: 84
DRG: 470 | End: 2019-04-24
Attending: ORTHOPAEDIC SURGERY
Payer: MEDICARE

## 2019-04-24 VITALS
SYSTOLIC BLOOD PRESSURE: 89 MMHG | WEIGHT: 210 LBS | RESPIRATION RATE: 16 BRPM | DIASTOLIC BLOOD PRESSURE: 47 MMHG | OXYGEN SATURATION: 92 % | TEMPERATURE: 99.3 F | BODY MASS INDEX: 30.06 KG/M2 | HEIGHT: 70 IN

## 2019-04-24 LAB
GLUCOSE SERPL-MCNC: 97 MG/DL (ref 70–99)
HGB BLD-MCNC: 10.6 G/DL (ref 13.3–17.7)

## 2019-04-24 PROCEDURE — 25000132 ZZH RX MED GY IP 250 OP 250 PS 637: Performed by: ORTHOPAEDIC SURGERY

## 2019-04-24 PROCEDURE — A9270 NON-COVERED ITEM OR SERVICE: HCPCS | Performed by: ORTHOPAEDIC SURGERY

## 2019-04-24 PROCEDURE — 36415 COLL VENOUS BLD VENIPUNCTURE: CPT | Performed by: ORTHOPAEDIC SURGERY

## 2019-04-24 PROCEDURE — 85018 HEMOGLOBIN: CPT | Performed by: ORTHOPAEDIC SURGERY

## 2019-04-24 PROCEDURE — 97535 SELF CARE MNGMENT TRAINING: CPT | Mod: GO

## 2019-04-24 PROCEDURE — 40000275 ZZH STATISTIC RCP TIME EA 10 MIN

## 2019-04-24 PROCEDURE — 82947 ASSAY GLUCOSE BLOOD QUANT: CPT | Performed by: ORTHOPAEDIC SURGERY

## 2019-04-24 PROCEDURE — 99231 SBSQ HOSP IP/OBS SF/LOW 25: CPT | Performed by: INTERNAL MEDICINE

## 2019-04-24 RX ADMIN — SENNOSIDES AND DOCUSATE SODIUM 1 TABLET: 8.6; 5 TABLET ORAL at 09:39

## 2019-04-24 RX ADMIN — ASPIRIN 325 MG: 325 TABLET, COATED ORAL at 08:23

## 2019-04-24 RX ADMIN — ACETAMINOPHEN 975 MG: 325 TABLET, FILM COATED ORAL at 05:49

## 2019-04-24 RX ADMIN — FINASTERIDE 5 MG: 5 TABLET, FILM COATED ORAL at 09:39

## 2019-04-24 RX ADMIN — OXYCODONE HYDROCHLORIDE 5 MG: 5 TABLET ORAL at 05:49

## 2019-04-24 ASSESSMENT — ACTIVITIES OF DAILY LIVING (ADL)
ADLS_ACUITY_SCORE: 18
ADLS_ACUITY_SCORE: 23
ADLS_ACUITY_SCORE: 23

## 2019-04-24 NOTE — PLAN OF CARE
Pt is POD 2 rt hip replacement.  CMS intact.  T 99.3.  Aquacele in place with no drainage noted.  Up to chair for breakfast, good appetite.  AM meds given.  Fine crackles in L lung base.  Encourage cough and DB.  On 2 LPM O2 with SATS 92%.  Up with asst x1 and use of walker, stable on feet.  Alarms in use and call light in reach.

## 2019-04-24 NOTE — PLAN OF CARE
Up in chair for supper.  Ambulated to bathroom and in hallway at beginning of shift.  Used urinal at bedside with assistance to stand.  Staff noted became less steady as evening progressed.  Needs verbal cues when using walker.  O2 sats variable, so O2 left on for the night.       Face to face report given with opportunity to observe patient.    Report given to Nancie PARMAR & Debbie HOLLINGSWORTH   4/23/2019  11:43 PM

## 2019-04-24 NOTE — PROGRESS NOTES
[facility]    Hospitalist Progress Note    Date of Service (when I saw the patient): 04/24/2019    Assessment & Plan   Vinny Henry is a 84 year old male who was admitted on 4/22/2019 for post op cares R ALONSO.    POD#2 elective R ALONSO: pain controlled, doing well.  - post op cares per ortho    Tobacco dependence: nicotine replacement    BPH: continue finasteride     DVT Prophylaxis: Defer to primary service    Code Status: Full Code    Disposition: Medically clear for discharge, discharge per ortho.    Raymundo Pradhan MD      Interval History   Patient seen in room, doing well.  Doing well on room air.  No cp, sob, abdominal pain.  Surgical pain controlled and improving.    -Data reviewed today: I reviewed all new labs and imaging results over the last 24 hours.    Physical Exam   Temp: 99.8  F (37.7  C) Temp src: Tympanic BP: 121/74   Heart Rate: 68 Resp: 18 SpO2: 92 % O2 Device: Nasal cannula Oxygen Delivery: 1 LPM(decreased to RA)  Vitals:    04/22/19 0722   Weight: 95.3 kg (210 lb)     Vital Signs with Ranges  Temp:  [96.5  F (35.8  C)-99.8  F (37.7  C)] 99.8  F (37.7  C)  Heart Rate:  [59-73] 68  Resp:  [16-18] 18  BP: (112-121)/(54-74) 121/74  SpO2:  [67 %-97 %] 92 %      Intake/Output Summary (Last 24 hours) at 4/24/2019 0824  Last data filed at 4/24/2019 0606  Gross per 24 hour   Intake 323 ml   Output 2245 ml   Net -1922 ml       Peripheral IV 04/22/19 Left Lower forearm (Active)   Site Assessment WDL 4/24/2019 12:30 AM   Line Status Saline locked 4/24/2019 12:30 AM   Phlebitis Scale 0-->no symptoms 4/24/2019 12:30 AM   Infiltration Scale 0 4/24/2019 12:30 AM   Infiltration Site Treatment Method  None 4/22/2019  7:25 AM   Extravasation? No 4/24/2019 12:30 AM   Dressing Intervention New dressing  4/24/2019 12:30 AM   Number of days: 2       Urethral Catheter 16 fr (Active)   Number of days: 1859       Rash 04/22/19 1122 Right other (see comments) (Active)   Distribution generalized 4/22/2019  11:01 AM   Characteristics edema 4/22/2019 11:01 AM   Color red 4/22/2019 11:01 AM   Care, Rash open to air 4/24/2019 12:28 AM   Number of days: 2       Incision/Surgical Site 12/13/13 Left Groin (Active)   Number of days: 1958       Incision/Surgical Site 04/22/19 Right Hip (Active)   Incision Assessment UTV 4/24/2019 12:28 AM   Misty-Incision Assessment UTV 4/24/2019 12:28 AM   Closure LUIGI 4/24/2019 12:28 AM   Incision Drainage Amount None 4/24/2019 12:28 AM   Drainage Description Other (Comment) 4/24/2019 12:28 AM   Dressing Intervention Clean, dry, intact 4/24/2019 12:28 AM   Number of days: 2     No line/device    Constitutional - AA, NAD  HEENT - atraumatic, normocephalic  Neck - supple, no masses, no JVD  CVS - S1 S2 RRR, no murmurs, rubs, gallops  Respiratory - CTA b/l  GI - soft, NT, ND, + bowel sounds, no organomegaly  Musculoskeletal - no LE edema, no lesions  Neuro - oriented x 3, no gross focal deficits    Medications     No Medication Sleep Aids for this Patient         aspirin  325 mg Oral BID     finasteride  5 mg Oral Daily     nicotine  1 patch Transdermal Q24H     nicotine   Transdermal Q8H     nicotine   Transdermal Daily     senna-docusate  1 tablet Oral BID    Or     senna-docusate  2 tablet Oral BID     sodium chloride (PF)  3 mL Intracatheter Q8H       Data   Recent Labs   Lab 04/24/19  0519 04/23/19  0512   HGB 10.6* 10.7*   GLC 97  --           No results found for this or any previous visit (from the past 24 hour(s)).    Raymundo Pradhan MD

## 2019-04-24 NOTE — PLAN OF CARE
Occupational Therapy Discharge Summary    Reason for therapy discharge:    Discharged to home.    Progress towards therapy goal(s). See goals on Care Plan in [facility] electronic health record for goal details.  Goals partially met.  Barriers to achieving goals:   discharge from facility and pt receiving assistance at home for tasks prior to surgery and today receiving 2-3 verbal cues for safety with tasks .    Therapy recommendation(s):    Continue home exercise program.

## 2019-04-24 NOTE — PLAN OF CARE
Pt is POD 2 rt hip replacement.  CMS intact.  T 99.3.  Aquacele in place with no drainage noted.  Up to chair for breakfast, good appetite.  AM meds given.  Fine crackles in L lung base.  Encourage cough and DB.  On 2 LPM O2 with SATS 92%.  Up with asst x1 and use of walker, stable on feet.  Alarms in use and call light in reach.     Face to face report given with opportunity to observe patient.    Report given to Nayeli Rogers   4/24/2019  10:37 AM

## 2019-04-24 NOTE — PLAN OF CARE
"Reason for hospital stay:  S/P ALONSO  Most recent vitals: /74   Temp 99.8  F (37.7  C) (Tympanic)   Resp 18   Ht 1.778 m (5' 10\")   Wt 95.3 kg (210 lb)   SpO2 95%   BMI 30.13 kg/m    Pain Management:  Administered Tylenol et Oxycodone at 0600 for pain to R hip rating 4-5/10, sleeping on reassessment  Orientation:  Alert with some forgetfulness  Cardiac:  WDL  Respiratory:  Lung sounds clear et diminished bilat sats 86-96% on 2lpm via nc, lots of encouragement given to deep breathe   GI:  Bowel sounds audible et active x4  :  1 episode of incontinence, also has urinary hesitancy et dribbling   Skin Issues:  R hip Aquacel dressing clean, dry, et intact-no shadowing.  IVF:  Saline locked  ABX:  n/a  Mobility:  A2  with gait belt et walker for use of urinal at bedside, independently moves in bed  Safety:  Alarms audible et active   Comments:        4/24/2019  4:48 AM  Debbie Kumari  Face to face report given with opportunity to observe patient.    Report given to Janae Kumari   4/24/2019  7:13 AM      "

## 2019-04-24 NOTE — PROGRESS NOTES
Name: Vinny Henry    MRN#: [id number]    Reason for Hospitalization: DJD RIGHT HIP  Status post right hip replacement    Discharge Date: 4/24/2019    Patient / Family response to discharge plan: in agreement    Follow-Up Appt: No future appointments.    Other Providers (Care Coordinator, County Services, PCA services etc): Alesia, Dr Medel to set up PT    Discharge Disposition: home, with daughter transporting    Anne Avalos

## 2019-04-24 NOTE — PLAN OF CARE
Patient discharged at 11:19 AM via wheel chair accompanied by daughter and staff. Prescriptions sent to patients preferred pharmacy. All belongings sent with patient.     Discharge instructions reviewed with patient & daughter. Listed belongings gathered and returned to patient. done    Patient discharged to home.   Report called to NA    Core Measures and Vaccines  Core Measures applicable during stay: No. If yes, state diagnosis: NA  Pneumonia and Influenza given prior to discharge, if indicated: N/A    Surgical Patient   Surgical Procedures during stay: yes  Did patient receive discharge instruction on wound care and recognition of infection symptoms? Yes    MISC  Follow up appointment made:  Yes  Home and hospital aquired medications returned to patient: N/A  Patient reports pain was well managed at discharge: Yes

## 2019-04-26 ENCOUNTER — TELEPHONE (OUTPATIENT)
Dept: CASE MANAGEMENT | Facility: HOSPITAL | Age: 84
End: 2019-04-26

## 2019-04-26 NOTE — TELEPHONE ENCOUNTER
Vinny PAREDES Lucianomoshe, was discharged to home on 4/24/19   from [facility]. I spoke today with him regarding his  discharge.   He indicates he has receive(d) sufficient information upon discharge. Medications were reviewed in full on discharge, including: Medications to be started;  medications to be continued from preadmission and any side effects. Prescriptions were received at discharge and were able to be filled. Medications are being taken as prescribed.   He indicates he has  an appointment scheduled for May 8  and has  transportation available. He was  able to confirm their appointment time and have it written down.   He did not have any questions regarding discharge instructions or condition.  Per their request, the following employee (s) can be recognized for their outstanding services delivered:  Care was pretty good.   Suggestions to improve service: Nothing indicated.   He was informed he  may receive a survey in the mail from the hospital. Asked if they would kindly complete the survey in order for [facility] to know if services fully met patient needs.

## 2019-04-29 ENCOUNTER — DOCUMENTATION ONLY (OUTPATIENT)
Dept: OTHER | Facility: CLINIC | Age: 84
End: 2019-04-29

## 2019-04-30 NOTE — DISCHARGE SUMMARY
Date of Admission: 4/22/19    Date of Discharge: 4/24/19    Admitting Physician: Neo Medel MD    Consultations: Hospitalist Service    Admitting Diagnosis: Right Hip Osteoarthritis    Discharge Diagnosis: Right Hip Osteoarthritis    Surgical Procedures Performed: Right Direct Anterior ALONSO    Hospital Complications: None    Discharge Medications: No Changes to home meds; Oxycodone 5-10 mg po every 4 hours added for pain control;  BID for DVT prophylaxis    Discharge Disposition: Home    Discharge Diet: As at home    Discharge Activity: WBAT Right Lower Extremity; No Hip ROM precautions    Hospital Course:   The patient underwent a Right Direct Anterior ALONSO.  There were no intraoperative or immediate post operative complications.  Once stable in the PACU they were transferred to the hospital floor where they remained for the remainder of the hospital stay.  Pain management transitioned from IV to oral pain medications.  Physical/Occupational Therapy was consulted for early mobility and gait training as well as ADL training.  DVT prophylaxis was initiated on POD #1.  Vital signs and hemoglobin remained stable. The Hospitalist team was consulted for management of medical co-morbidities.  At time of discharge the patient was medically stable and cleared by Therapy for safe discharge home.        Follow-Up: 10-14 days [facility]    Questions: Call [phone] or [phone]

## 2024-05-16 ENCOUNTER — TRANSFERRED RECORDS (OUTPATIENT)
Dept: HEALTH INFORMATION MANAGEMENT | Facility: CLINIC | Age: 89
End: 2024-05-16

## 2024-05-30 NOTE — H&P (VIEW-ONLY)
History of Present Illness:    Gabe Henry is a 89 year old male who is here for a preoperative history and physical.  He is scheduled for bilateral cataract surgery by Dr. Иван Galarza at [facility] on 6/10/2024 (right eye) and 6/24/2024 (left eye).  Indications include bilateral cataracts affecting vision.    Patient has discussed risks, benefits, post operative expectations and limitations and wishes to proceed with surgical intervention.  He has made arrangements for pre operative COVID-19 test if needed, someone to drive him and be with him after surgery.      Patient does have a history of CKD stage III as well as a AAA.  He is not presently on medication and monitors with routine labs.  Denies cardiac or urinary symptoms.     Pre-operative Risk Assessment and ROS:  General:  History of significant transfusion antibody reaction: No history of blood transfusion  Diabetes Mellitus: No  Dyspnea: No  Functional Health Status: independent  Chronic Pain: No  Open wound with/without infection: No  MRSA/Skin Infection history: No  >10% loss of body weight past 6 months: No  Fever > 101 in past month: No  Steroid use for chronic condition: No  Exposure to Prednisone > 5 mg/day for > 3 weeks in past 6 months: No  Bleeding disorders: No  Actively managed cancer: No  Anesthesia: No history of problems  Pregnancy: Not applicable     Functional Capacity Assessment:  Able to walk 200 feet (2 blocks) without stopping due to symptoms? Yes - > 4 METS capacity.    Past Medical History:      Past Medical History:   Diagnosis Date     Acute left-sided low back pain with left-sided sciatica 07/01/2016     Benign neoplasm of adrenal gland 11/29/2001    Resected in 1996     Benign neoplasm of colon 10/02/2007    Ulceration of the intestine; diaphragmatic hernia w/0 mention of obstruction; Ulceration of the intestine; benign neoplasm of the stomach; diverticulosis w/o mention of hemorrhage; screening for malignant  neoplasms of colon. with upper GI endoscopy with biopsy and Colonoscopy with lesion snare.     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     Diaphragmatic hernia without mention of obstruction or gangrene 11/30/2001    Noted on barium swallow and upper GI 11-30-01     Disorders of sacrum 02/01/2005    Left sacroiliac pain     Elevated prostate specific antigen (PSA) 03/12/2002    US bx 03-01-02 showed BPH, squamous metaplasia     Esophageal reflux 11/29/2001    GERD     Esophagitis, unspecified 11/30/2001    Distal esophagitis     Hematuria 09/27/2006    Microscopic hematuria; probable idiopathic microhematuria     Hypertrophy (benign) of prostate 11/29/2001    BPH     Malignant neoplasm of larynx, unspecified site 11/29/2001    Carcinoma of the larynx, 1994,  treated with radiation     Other chest pain 11/29/2001    Episodes of exertinal chest pain     Other seborrheic keratosis 01/03/2002     Overweight(278.02) 09/19/2006     Tobacco use disorder 03/24/1998    1/2 PPD; 40 packyear per note 06-28-94     Unspecified disorder of skin and subcutaneous tissue 01/03/2002    Multiple pigmented lesions     Unspecified essential hypertension 03/06/2003     Unspecified internal derangement of knee 03/24/1998    Left     Urinary catheter (Herr) change required 03/22/2014           Past Surgical History:   Procedure Laterality Date     BIOPSY OF PROSTATE,NEEDLE/PUNCH  03/01/2002    Bilateral needle biopsies; BPH and squamous metaplasia     COLONOSCOPY  12/14/2010     To the cecum. repeat in 3yrs     EGD BIOPSY SINGLE/MULTIPLE  10/02/2007    [facility]     EGD TRANSORAL DIAGNOSTIC  11/30/2001    With barium swallow; GERD with poor clearing mechanism; small hiatal hernia; distal esophagitis     ENDOCRINE SURGERY PROC UNLISTED  06/20/1996    Pheochromocytoma resected     EXC SKIN MALIG <5MM TRUNK,ARM,LEG  08/14/1986    re-excition of lentigo maligna from sternal area; initial excision 08-05-86     EXCISE ADRENAL GLAND  06/20/1996    Right adrenalectomy, excision of pheochromocytoma     KNEE ARTHROSCOPY/SURGERY  03/24/1998    Left knee surgery; internal derangement; arthroscopoy, excision posteiror horn; medial meniscus shaving, debridement, chondroplasty; medial femoral condyle, shaving of patella and patellofemoral articulation     REMOVAL GALLBLADDER  06/20/1996    Cholecystectomy and right adrenal carcinoma excised, pheocromocytoma, right excised     TOTAL HIP REPLACEMENT Left        Meds:      Current Outpatient Medications   Medication Sig Dispense Refill     acetaminophen (TYLENOL) 500 MG tablet Take 2 Tabs by mouth three times a day as needed for Pain.       polyethylene glycol 3350 (Miralax) powder one time a day as needed. Take 1 capful (17g) every other. 1020 g 11     No current facility-administered medications for this visit.       Allergies:    Flomax [tamsulosin hydrochloride] and Food    Family History:      Family History   Problem Relation Name Age of Onset     Cardiovascular Disease Mother          CAD; S/P CABG     Hypertension Mother       Cancer Father          Lymph cancer     Diabetes Daughter          Type I     Diabetes Paternal Grandmother          not sure if type I or type II     Anesthesia Reaction Negative Family Hx       Bleeding Disorder Negative Family Hx         Social History:      Social History     Tobacco Use     Smoking status: Every Day     Current packs/day: 0.00     Average packs/day: 0.5 packs/day for 50.0 years  "(25.0 ttl pk-yrs)     Types: Cigarettes     Start date: 1969     Last attempt to quit: 2019     Years since quittin.1     Smokeless tobacco: Never   Substance Use Topics     Alcohol use: No       Review of Systems (undiagnosed new complaints):  Constitutional: no weight loss, fever, night sweats and feels well  Nutrition: eats regular diet with adequate water intake  Skin: no rashes, pigmentation changes, or lesions of concern  Head/Eyes/Ears/Nose/Throat: denies headache, ear pain or loss of hearing, blurred or double vision, nasal discharge or sore throat.  Cardiovascular: no chest pains or palpitations  Respiratory: no cough, wheeze, or shortness of breath  Lymph: denies lymph node tenderness or swelling  Gastrointestinal: no dysphagia, abdominal pain, nausea, vomiting, change in bowel habits, melena, hematochezia  Genitourinary: no change in urination or blood in urine  Musculoskeletal: no joint pain or stiffness  Neurologic: no history of focal neurologic symptoms, spells, memory changes  Psychiatric: no significant  anxiety, depression, or panic    Physical Exam:  Vitals:    24 1513   BP: 120/74   Pulse: 78   Resp: 18   Height: 5' 10\" (1.778 m)   Weight: 205 lb 7.5 oz (93.2 kg)   SpO2: 93%   BMI (Calculated): 29.48     GENERAL:  No acute distress.  SKIN:  No rashes, jaundice or suspicious lesions.  HEENT:  Head is atraumatic and normocephalic. Eyes PERRL.  Ear canals clear. TMs clear with normal landmarks.  Nares clear with midline septum.  No oral lesions.  Throat without erythema or exudate.   NECK:  Neck supple without adenopathy, thyromegaly.  LUNGS:  Clear with easy respirations.   HEART:  Regular without murmurs, rubs or gallops.  ABDOMEN:  Soft, nontender and nondistended and no  hepatosplenomegaly or masses.  EXTREMITIES:  No edema, well perfused.  NEURO:  Alert and oriented x 3.  Motor exam grossly nonfocal.    Recent Results (from the past 24 hour(s))   BASIC METABOLIC PANEL   Result " Value Ref Range    Sodium 141 134 - 143 mEq/L    Potassium 4.6 3.4 - 5.1 mEq/L    Chloride 107 99 - 110 mEq/L    Carbon Dioxide 25 19 - 29 mEq/L    Anion Gap 9.0 3.0 - 15.0 mEq/L    Blood Urea Nitrogen 26 (H) 5 - 24 mg/dL    Creatinine 1.34 (H) 0.70 - 1.20 mg/dL    Glomerular Filtration Rate 51 (L) >60 mL/min/1.73 m*2    Calcium 9.3 8.4 - 10.5 mg/dL    Glucose 94 70 - 99 mg/dL    Narrative    Current ADA criteria for Glucose:       Normal: 70-99 mg/dL      Impaired Fasting Glucose: 100-125 mg/dL      Diabetes Mellitus: at or above 126 mg/dL  The diagnosis of diabetes must be confirmed on a subsequent day by measuring Fasting Plasma Glucose, 2-hr PG or random plasma glucose (if symptoms are present).     Lab:  CBC, BMP ordered   EKG: no acute changes, no ST elevations/depression, sinus rhythm, mild bradycardia 58 rate.  Chest x-ray: not indicated    ASSESSMENT:    -Pre-operative Exam:  Satisfactory    -Bilateral cataracts, plan for staged surgical repair as noted above.    -Current and past problems as above are stable.    PLAN:    Cleared for anesthesia and surgery as planned. Understands potential risks of surgery and anesthesia.    Follow your surgeon's pre and post operative instructions. Shower with antibacterial soap night before and morning of surgery. Nothing to eat or drink after midnight the night before surgery.

## 2024-06-02 ENCOUNTER — ANESTHESIA EVENT (OUTPATIENT)
Dept: SURGERY | Facility: HOSPITAL | Age: 89
End: 2024-06-02
Payer: MEDICARE

## 2024-06-02 ASSESSMENT — LIFESTYLE VARIABLES: TOBACCO_USE: 1

## 2024-06-03 NOTE — ANESTHESIA PREPROCEDURE EVALUATION
"Anesthesia Pre-Procedure Evaluation    Patient: Vinny Henry   MRN: [id number] : 1935        Procedure : Procedure(s):  Phacoemulsification Cataract Extraction Posterior Chamber Lens Right Eye          No past medical history on file.   Past Surgical History:   Procedure Laterality Date    ARTHROPLASTY HIP      left    ARTHROPLASTY HIP ANTERIOR Right 2019    Procedure: DIRECT ANTERIOR RIGHT TOTAL HIP;  Surgeon: Neo Medel MD;  Location: [facility]      Allergies   Allergen Reactions    Strawberry Extract Hives      Social History     Tobacco Use    Smoking status: Every Day     Current packs/day: 0.25     Types: Cigarettes    Smokeless tobacco: Not on file   Substance Use Topics    Alcohol use: No      Wt Readings from Last 1 Encounters:   19 95.3 kg (210 lb)        Anesthesia Evaluation   Pt has had prior anesthetic. Type: MAC and General (spinal).    History of anesthetic complications       ROS/MED HX  ENT/Pulmonary:     (+)                tobacco use, Current use,                       Neurologic:  - neg neurologic ROS     Cardiovascular: Comment: At time of L hip replacement in  had some \"dropped p waves\" noted by anesthesia, cardiology consultation in hospital at that time felt that he had sinus with 1st degree AV block, occasional PVC's and what were \"dropped p waves\" were some non-conducted PAC's and some short episodes of 2nd degree Mobitz type 1 block (per 2019 preop from [facility]).   AAA    (+)  hypertension- -   -  - -                                 Previous cardiac testing   Echo: Date: Results:    Stress Test:  Date: 2012 Results:  EF 64%, no ischemia  ECG Reviewed:  Date: 24 Results:  SR no ST elevations or depression, mild SB rate 58  Cath:  Date: Results:      METS/Exercise Tolerance: 3 - Able to walk 1-2 blocks without stopping    Hematologic:       Musculoskeletal: Comment: Acute left sided low back pain with left sided sciatica  Recent fall last week at home, " "bruised right eye, patient did not go to ER   Patient declines  help with living, lives at home with wife, daughter also declines.   (+)  arthritis (hip),             GI/Hepatic:     (+) GERD, Asymptomatic on medication,                  Renal/Genitourinary:     (+) renal disease, type: CRI,     Nephrolithiasis , BPH,      Endo:  - neg endo ROS     Psychiatric/Substance Use:  - neg psychiatric ROS     Infectious Disease:       Malignancy:   (+) Malignancy, History of Other.Other CA Malignant neoplasm of larynx status post Radiation.    Other:  - neg other ROS          Physical Exam    Airway        Mallampati: II   TM distance: > 3 FB   Neck ROM: full   Mouth opening: > 3 cm    Respiratory Devices and Support         Dental       (+) Edentulous      Cardiovascular   cardiovascular exam normal       Rhythm and rate: regular and normal     Pulmonary   pulmonary exam normal        breath sounds clear to auscultation       OUTSIDE LABS:  CBC:   Lab Results   Component Value Date    HGB 10.6 (L) 04/24/2019    HGB 10.7 (L) 04/23/2019     BMP:   Lab Results   Component Value Date    GLC 97 04/24/2019     COAGS: No results found for: \"PTT\", \"INR\", \"FIBR\"  POC: No results found for: \"BGM\", \"HCG\", \"HCGS\"  HEPATIC: No results found for: \"ALBUMIN\", \"PROTTOTAL\", \"ALT\", \"AST\", \"GGT\", \"ALKPHOS\", \"BILITOTAL\", \"BILIDIRECT\", \"AMARA\"  OTHER: No results found for: \"PH\", \"LACT\", \"A1C\", \"JANINE\", \"PHOS\", \"MAG\", \"LIPASE\", \"AMYLASE\", \"TSH\", \"T4\", \"T3\", \"CRP\", \"SED\"    Anesthesia Plan    ASA Status:  3    NPO Status:  NPO Appropriate    Anesthesia Type: MAC.              Consents    Anesthesia Plan(s) and associated risks, benefits, and realistic alternatives discussed. Questions answered and patient/representative(s) expressed understanding.     - Discussed:     - Discussed with:  Patient            Postoperative Care            Comments:    Other Comments:  5/30/24 preop from [facility]    Does not want sedation at " this time.     Risks and benefits of MAC anesthetic discussed including dental damage, aspiration, loss of airway, conversion to general anesthetic, CV complications, MI, stroke, death. Pt wishes to proceed.            MARU Echavarria CNP    I have reviewed the pertinent notes and labs in the chart from the past 30 days and (re)examined the patient.  Any updates or changes from those notes are reflected in this note.

## 2024-06-06 ENCOUNTER — TRANSFERRED RECORDS (OUTPATIENT)
Dept: HEALTH INFORMATION MANAGEMENT | Facility: CLINIC | Age: 89
End: 2024-06-06

## 2024-06-10 ENCOUNTER — HOSPITAL ENCOUNTER (OUTPATIENT)
Facility: HOSPITAL | Age: 89
Discharge: HOME | End: 2024-06-10
Attending: OPHTHALMOLOGY | Admitting: OPHTHALMOLOGY
Payer: MEDICARE

## 2024-06-10 ENCOUNTER — ANESTHESIA (OUTPATIENT)
Dept: SURGERY | Facility: HOSPITAL | Age: 89
End: 2024-06-10
Payer: MEDICARE

## 2024-06-10 VITALS
HEIGHT: 70 IN | BODY MASS INDEX: 28.63 KG/M2 | OXYGEN SATURATION: 94 % | TEMPERATURE: 96.8 F | HEART RATE: 57 BPM | RESPIRATION RATE: 16 BRPM | SYSTOLIC BLOOD PRESSURE: 146 MMHG | DIASTOLIC BLOOD PRESSURE: 68 MMHG | WEIGHT: 200 LBS

## 2024-06-10 PROCEDURE — 250N000009 HC RX 250: Performed by: OPHTHALMOLOGY

## 2024-06-10 PROCEDURE — 999N000141 HC STATISTIC PRE-PROCEDURE NURSING ASSESSMENT: Performed by: OPHTHALMOLOGY

## 2024-06-10 PROCEDURE — 99100 ANES PT EXTEME AGE<1 YR&>70: CPT | Performed by: NURSE ANESTHETIST, CERTIFIED REGISTERED

## 2024-06-10 PROCEDURE — 250N000011 HC RX IP 250 OP 636: Mod: JZ | Performed by: OPHTHALMOLOGY

## 2024-06-10 PROCEDURE — 360N000076 HC SURGERY LEVEL 3, PER MIN: Performed by: OPHTHALMOLOGY

## 2024-06-10 PROCEDURE — V2632 POST CHMBR INTRAOCULAR LENS: HCPCS | Performed by: OPHTHALMOLOGY

## 2024-06-10 PROCEDURE — 710N000012 HC RECOVERY PHASE 2, PER MINUTE: Performed by: OPHTHALMOLOGY

## 2024-06-10 PROCEDURE — 370N000017 HC ANESTHESIA TECHNICAL FEE, PER MIN: Performed by: OPHTHALMOLOGY

## 2024-06-10 PROCEDURE — 66984 XCAPSL CTRC RMVL W/O ECP: CPT | Performed by: NURSE ANESTHETIST, CERTIFIED REGISTERED

## 2024-06-10 PROCEDURE — 272N000001 HC OR GENERAL SUPPLY STERILE: Performed by: OPHTHALMOLOGY

## 2024-06-10 DEVICE — IMPLANTABLE DEVICE: Type: IMPLANTABLE DEVICE | Site: EYE | Status: FUNCTIONAL

## 2024-06-10 RX ORDER — LIDOCAINE 40 MG/G
CREAM TOPICAL
Status: DISCONTINUED | OUTPATIENT
Start: 2024-06-10 | End: 2024-06-10 | Stop reason: HOSPADM

## 2024-06-10 RX ORDER — PHENYLEPHRINE HYDROCHLORIDE 100 MG/ML
1 SOLUTION/ DROPS OPHTHALMIC
Status: DISCONTINUED | OUTPATIENT
Start: 2024-06-10 | End: 2024-06-10 | Stop reason: HOSPADM

## 2024-06-10 RX ORDER — CYCLOPENTOLATE HYDROCHLORIDE 20 MG/ML
1 SOLUTION/ DROPS OPHTHALMIC ONCE
Status: COMPLETED | OUTPATIENT
Start: 2024-06-10 | End: 2024-06-10

## 2024-06-10 RX ORDER — NALOXONE HYDROCHLORIDE 0.4 MG/ML
0.1 INJECTION, SOLUTION INTRAMUSCULAR; INTRAVENOUS; SUBCUTANEOUS
Status: DISCONTINUED | OUTPATIENT
Start: 2024-06-10 | End: 2024-06-10 | Stop reason: HOSPADM

## 2024-06-10 RX ORDER — PHENYLEPHRINE HYDROCHLORIDE 100 MG/ML
1 SOLUTION/ DROPS OPHTHALMIC ONCE
Status: COMPLETED | OUTPATIENT
Start: 2024-06-10 | End: 2024-06-10

## 2024-06-10 RX ORDER — CYCLOPENTOLAT/TROPIC/PHENYLEPH 1%-1%-2.5%
1 DROPS (EA) OPHTHALMIC (EYE)
Status: COMPLETED | OUTPATIENT
Start: 2024-06-10 | End: 2024-06-10

## 2024-06-10 RX ORDER — ACETAMINOPHEN 325 MG/1
650 TABLET ORAL
Status: DISCONTINUED | OUTPATIENT
Start: 2024-06-10 | End: 2024-06-10 | Stop reason: HOSPADM

## 2024-06-10 RX ORDER — LEVOBUNOLOL HYDROCHLORIDE 5 MG/ML
SOLUTION/ DROPS OPHTHALMIC PRN
Status: DISCONTINUED | OUTPATIENT
Start: 2024-06-10 | End: 2024-06-10 | Stop reason: HOSPADM

## 2024-06-10 RX ORDER — MOXIFLOXACIN 5 MG/ML
1 SOLUTION/ DROPS OPHTHALMIC
Status: COMPLETED | OUTPATIENT
Start: 2024-06-10 | End: 2024-06-10

## 2024-06-10 RX ORDER — DEXAMETHASONE SODIUM PHOSPHATE 10 MG/ML
4 INJECTION, SOLUTION INTRAMUSCULAR; INTRAVENOUS
Status: DISCONTINUED | OUTPATIENT
Start: 2024-06-10 | End: 2024-06-10 | Stop reason: HOSPADM

## 2024-06-10 RX ORDER — ONDANSETRON 4 MG/1
4 TABLET, ORALLY DISINTEGRATING ORAL EVERY 30 MIN PRN
Status: DISCONTINUED | OUTPATIENT
Start: 2024-06-10 | End: 2024-06-10 | Stop reason: HOSPADM

## 2024-06-10 RX ORDER — PROPARACAINE HYDROCHLORIDE 5 MG/ML
1 SOLUTION/ DROPS OPHTHALMIC ONCE
Status: COMPLETED | OUTPATIENT
Start: 2024-06-10 | End: 2024-06-10

## 2024-06-10 RX ORDER — LIDOCAINE HYDROCHLORIDE 20 MG/ML
JELLY TOPICAL ONCE
Status: COMPLETED | OUTPATIENT
Start: 2024-06-10 | End: 2024-06-10

## 2024-06-10 RX ORDER — ONDANSETRON 2 MG/ML
4 INJECTION INTRAMUSCULAR; INTRAVENOUS EVERY 30 MIN PRN
Status: DISCONTINUED | OUTPATIENT
Start: 2024-06-10 | End: 2024-06-10 | Stop reason: HOSPADM

## 2024-06-10 RX ORDER — PROPARACAINE HYDROCHLORIDE 5 MG/ML
1 SOLUTION/ DROPS OPHTHALMIC
Status: COMPLETED | OUTPATIENT
Start: 2024-06-10 | End: 2024-06-10

## 2024-06-10 RX ORDER — LIDOCAINE HYDROCHLORIDE 10 MG/ML
INJECTION, SOLUTION EPIDURAL; INFILTRATION; INTRACAUDAL; PERINEURAL PRN
Status: DISCONTINUED | OUTPATIENT
Start: 2024-06-10 | End: 2024-06-10 | Stop reason: HOSPADM

## 2024-06-10 RX ORDER — TETRACAINE HYDROCHLORIDE 5 MG/ML
SOLUTION OPHTHALMIC PRN
Status: DISCONTINUED | OUTPATIENT
Start: 2024-06-10 | End: 2024-06-10 | Stop reason: HOSPADM

## 2024-06-10 RX ORDER — MOXIFLOXACIN 5 MG/ML
SOLUTION/ DROPS OPHTHALMIC PRN
Status: DISCONTINUED | OUTPATIENT
Start: 2024-06-10 | End: 2024-06-10 | Stop reason: HOSPADM

## 2024-06-10 RX ORDER — PILOCARPINE HYDROCHLORIDE 10 MG/ML
SOLUTION/ DROPS OPHTHALMIC PRN
Status: DISCONTINUED | OUTPATIENT
Start: 2024-06-10 | End: 2024-06-10 | Stop reason: HOSPADM

## 2024-06-10 RX ORDER — SODIUM CHLORIDE, SODIUM LACTATE, POTASSIUM CHLORIDE, CALCIUM CHLORIDE 600; 310; 30; 20 MG/100ML; MG/100ML; MG/100ML; MG/100ML
INJECTION, SOLUTION INTRAVENOUS CONTINUOUS
Status: DISCONTINUED | OUTPATIENT
Start: 2024-06-10 | End: 2024-06-10 | Stop reason: HOSPADM

## 2024-06-10 RX ADMIN — LIDOCAINE HYDROCHLORIDE: 20 JELLY TOPICAL at 09:22

## 2024-06-10 RX ADMIN — PHENYLEPHRINE HYDROCHLORIDE 1 DROP: 100 SOLUTION/ DROPS OPHTHALMIC at 08:56

## 2024-06-10 RX ADMIN — MOXIFLOXACIN 1 DROP: 5 SOLUTION/ DROPS OPHTHALMIC at 09:16

## 2024-06-10 RX ADMIN — Medication 1 DROP: at 09:19

## 2024-06-10 RX ADMIN — MOXIFLOXACIN 1 DROP: 5 SOLUTION/ DROPS OPHTHALMIC at 09:04

## 2024-06-10 RX ADMIN — MOXIFLOXACIN 1 DROP: 5 SOLUTION/ DROPS OPHTHALMIC at 09:09

## 2024-06-10 RX ADMIN — PROPARACAINE HYDROCHLORIDE 1 DROP: 5 SOLUTION/ DROPS OPHTHALMIC at 09:19

## 2024-06-10 RX ADMIN — Medication 1 DROP: at 09:00

## 2024-06-10 RX ADMIN — CYCLOPENTOLATE HYDROCHLORIDE 1 DROP: 20 SOLUTION/ DROPS OPHTHALMIC at 08:52

## 2024-06-10 RX ADMIN — PROPARACAINE HYDROCHLORIDE 1 DROP: 5 SOLUTION/ DROPS OPHTHALMIC at 08:51

## 2024-06-10 ASSESSMENT — ACTIVITIES OF DAILY LIVING (ADL)
ADLS_ACUITY_SCORE: 36
ADLS_ACUITY_SCORE: 37

## 2024-06-10 NOTE — OP NOTE
[facility]  Ophthalmology Full Operative Note    Pre-operative diagnosis: Combined form of age-related cataract, left eye [H25.812]   Post-operative diagnosis Same   Procedure: Procedure(s):  Phacoemulsification Cataract Extraction Posterior Chamber Lens Left  Eye   Surgeon: Zane Galarza MD   Assistants(s):    Anesthesia: MAC with Local    Estimated blood loss: None    Total IV fluids: (See anesthesia record)   Specimens: None   Implants: 22.5 LI61AO   Findings:    Complications: None   Condition: Stable   Comments:       PROCEDURAL ANESTHESIA:     Topical/MAC.  Lidocaine 2% jelly topically and Lidocaine 1% preservative-free intracamerally.       PROCEDURE:  The patient was brought to the Operating Room and prepped and draped in a sterile manner.  A wire lid speculum was placed.  Noted a lot of eye movement during the case. A paracentesis was created and 1% Lidocaine was instilled in the anterior chamber.  Noted moderate dilation and later floppy iris and iris prolapse (history of Flomax use in the past). The anterior chamber was then filled with Amvisc Plus viscoelastic.  A clear cornea temporal wound was created using a 2.8 mm keratome.  A cystotome was used to initiate a flap in the anterior capsule and a Utrata forceps was used to create a continuous tear capsulorhexis.  Hydrodissection was performed.  The phacoemulsification tip was inserted into the eye and the nucleus and epinucleus were removed using a divide and conquer technique.  The residual cortex was removed using the I/A handpiece.  The anterior chamber was then refilled with viscoelastic and the wound was enlarged with the keratome.  The intraocular lens, 22.5 diopter, Model LI61AO, was placed into the injector and injected into the capsular bag. It was checked to make sure that it was central and stable.  Iris prolapse reposited with a Barraquer iris spatula during the case. Residual viscoelastic was removed using the I/A.  The  anterior chamber was refilled with BSS.  The wounds were hydrated with BSS and were noted to be watertight with no suture necessary.  Topical pilocarpine 1%, Betagan, and Vigamox was applied.  A hard shield was placed.     The patient tolerated the procedure well and was sent to the Recovery Room in satisfactory condition.

## 2024-06-10 NOTE — ANESTHESIA CARE TRANSFER NOTE
Patient: Vinny Henry    Procedure: Procedure(s):  Phacoemulsification Cataract Extraction Posterior Chamber Lens Left  Eye       Diagnosis: Combined form of age-related cataract, left eye [H25.812]  Diagnosis Additional Information: No value filed.    Anesthesia Type:   MAC     Note:    Oropharynx: oropharynx clear of all foreign objects and spontaneously breathing  Level of Consciousness: awake  Oxygen Supplementation: room air    Independent Airway: airway patency satisfactory and stable  Dentition: dentition unchanged  Vital Signs Stable: post-procedure vital signs reviewed and stable  Report to RN Given: handoff report given  Patient transferred to: Phase II    Handoff Report: Identifed the Patient, Identified the Reponsible Provider, Reviewed the pertinent medical history, Discussed the surgical course, Reviewed Intra-OP anesthesia mangement and issues during anesthesia, Set expectations for post-procedure period and Allowed opportunity for questions and acknowledgement of understanding      Vitals:  Vitals Value Taken Time   BP     Temp     Pulse     Resp     SpO2         Electronically Signed By: MARU APPLE CRNA  [place] 10, 2024  10:34 AM

## 2024-06-10 NOTE — INTERVAL H&P NOTE
I have reviewed the surgical (or preoperative) H&P that is linked to this encounter, and examined the patient. There are no significant changes.  Zane Galarza MD      Clinical Conditions Present on Arrival:  Clinically Significant Risk Factors Present on Admission

## 2024-06-10 NOTE — ANESTHESIA POSTPROCEDURE EVALUATION
Patient: Vinny Henry    Procedure: Procedure(s):  Phacoemulsification Cataract Extraction Posterior Chamber Lens Left  Eye       Anesthesia Type:  MAC    Note:  Disposition: Outpatient   Postop Pain Control: Uneventful            Sign Out: Well controlled pain   PONV: No   Neuro/Psych: Uneventful            Sign Out: Acceptable/Baseline neuro status   Airway/Respiratory: Uneventful            Sign Out: Acceptable/Baseline resp. status   CV/Hemodynamics: Uneventful            Sign Out: Acceptable CV status; No obvious hypovolemia; No obvious fluid overload   Other NRE: NONE   DID A NON-ROUTINE EVENT OCCUR? No           Last vitals:  Vitals Value Taken Time   /68 06/10/24 1100   Temp 96.8  F (36  C) 06/10/24 1100   Pulse 57 06/10/24 1055   Resp 16 06/10/24 1100   SpO2 94 % 06/10/24 1100       Electronically Signed By: MARU APPLE CRNA  [place] 10, 2024  11:22 AM

## 2024-06-10 NOTE — OR NURSING
Patient and responsible adult given discharge instructions with no questions regarding instructions. Marika score 20/20. Pain level 0/10.  Discharged from unit via ambulatory with cane. Patient discharged to home.

## 2024-06-11 ENCOUNTER — TRANSFERRED RECORDS (OUTPATIENT)
Dept: HEALTH INFORMATION MANAGEMENT | Facility: CLINIC | Age: 89
End: 2024-06-11

## 2024-06-17 ENCOUNTER — ANESTHESIA EVENT (OUTPATIENT)
Dept: SURGERY | Facility: HOSPITAL | Age: 89
End: 2024-06-17
Payer: MEDICARE

## 2024-06-17 ASSESSMENT — LIFESTYLE VARIABLES: TOBACCO_USE: 1

## 2024-06-17 NOTE — ANESTHESIA PREPROCEDURE EVALUATION
"Anesthesia Pre-Procedure Evaluation    Patient: Vinny Henry   MRN: [id number] : 1935        Procedure : Procedure(s):  Phacoemulsification Cataract Extraction Posterior Chamber Lens Right Eye          No past medical history on file.   Past Surgical History:   Procedure Laterality Date    ARTHROPLASTY HIP      left    ARTHROPLASTY HIP ANTERIOR Right 2019    Procedure: DIRECT ANTERIOR RIGHT TOTAL HIP;  Surgeon: Neo Medel MD;  Location: [facility]    PHACOEMULSIFICATION WITH STANDARD INTRAOCULAR LENS IMPLANT Left 6/10/2024    Procedure: Phacoemulsification Cataract Extraction Posterior Chamber Lens Left  Eye;  Surgeon: Zane Galarza MD;  Location: [facility]      Allergies   Allergen Reactions    Strawberry Extract Hives      Social History     Tobacco Use    Smoking status: Every Day     Current packs/day: 0.25     Types: Cigarettes    Smokeless tobacco: Not on file   Substance Use Topics    Alcohol use: No      Wt Readings from Last 1 Encounters:   06/10/24 90.7 kg (200 lb)        Anesthesia Evaluation   Pt has had prior anesthetic. Type: MAC and General (spinal).    History of anesthetic complications       ROS/MED HX  ENT/Pulmonary:     (+)                tobacco use, Current use,                       Neurologic:  - neg neurologic ROS     Cardiovascular: Comment: At time of L hip replacement in  had some \"dropped p waves\" noted by anesthesia, cardiology consultation in hospital at that time felt that he had sinus with 1st degree AV block, occasional PVC's and what were \"dropped p waves\" were some non-conducted PAC's and some short episodes of 2nd degree Mobitz type 1 block (per 2019 preop from [facility]).   AAA    (+)  hypertension- -   -  - -                                 Previous cardiac testing   Echo: Date: Results:    Stress Test:  Date: 2012 Results:  EF 64%, no ischemia  ECG Reviewed:  Date: 24 Results:  SR no ST elevations or depression, mild SB rate 58  Cath:  Date: " "Results:      METS/Exercise Tolerance: 3 - Able to walk 1-2 blocks without stopping    Hematologic:       Musculoskeletal: Comment: Acute left sided low back pain with left sided sciatica  Recent fall last week at home, bruised right eye, patient did not go to ER   Patient declines  help with living, lives at home with wife, daughter also declines.   (+)  arthritis (hip),             GI/Hepatic:     (+) GERD, Asymptomatic on medication,                  Renal/Genitourinary:     (+) renal disease, type: CRI,     Nephrolithiasis , BPH,      Endo:  - neg endo ROS     Psychiatric/Substance Use:  - neg psychiatric ROS     Infectious Disease:       Malignancy:   (+) Malignancy, History of Other.Other CA Malignant neoplasm of larynx status post Radiation.    Other:  - neg other ROS          Physical Exam    Airway        Mallampati: III   TM distance: > 3 FB   Neck ROM: full   Mouth opening: > 3 cm    Respiratory Devices and Support         Dental       (+) Edentulous and Removable bridges or other hardware      Cardiovascular   cardiovascular exam normal       Rhythm and rate: regular and normal     Pulmonary           breath sounds clear to auscultation   (-) no decreased breath sounds      OUTSIDE LABS:  CBC:   Lab Results   Component Value Date    HGB 10.6 (L) 04/24/2019    HGB 10.7 (L) 04/23/2019     BMP:   Lab Results   Component Value Date    GLC 97 04/24/2019     COAGS: No results found for: \"PTT\", \"INR\", \"FIBR\"  POC: No results found for: \"BGM\", \"HCG\", \"HCGS\"  HEPATIC: No results found for: \"ALBUMIN\", \"PROTTOTAL\", \"ALT\", \"AST\", \"GGT\", \"ALKPHOS\", \"BILITOTAL\", \"BILIDIRECT\", \"AMARA\"  OTHER: No results found for: \"PH\", \"LACT\", \"A1C\", \"JANINE\", \"PHOS\", \"MAG\", \"LIPASE\", \"AMYLASE\", \"TSH\", \"T4\", \"T3\", \"CRP\", \"SED\"    Anesthesia Plan    ASA Status:  3    NPO Status:  NPO Appropriate    Anesthesia Type: MAC.     - Reason for MAC: straight local not clinically adequate   Induction: N/a.   Maintenance: N/A.    " "    Consents    Anesthesia Plan(s) and associated risks, benefits, and realistic alternatives discussed. Questions answered and patient/representative(s) expressed understanding.     - Discussed: Risks, Benefits and Alternatives for BOTH SEDATION and the PROCEDURE were discussed     - Discussed with:  Patient            Postoperative Care            Comments:    Other Comments: Hp 5/30/24    Patient did not receive anything last time and doesn't want anything today            Blaise Conti, APRN CRNA    I have reviewed the pertinent notes and labs in the chart from the past 30 days and (re)examined the patient.  Any updates or changes from those notes are reflected in this note.              # Overweight: Estimated body mass index is 28.7 kg/m  as calculated from the following:    Height as of 6/10/24: 1.778 m (5' 10\").    Weight as of 6/10/24: 90.7 kg (200 lb).      "

## 2024-06-24 ENCOUNTER — HOSPITAL ENCOUNTER (OUTPATIENT)
Facility: HOSPITAL | Age: 89
Discharge: HOME | End: 2024-06-24
Attending: OPHTHALMOLOGY | Admitting: OPHTHALMOLOGY
Payer: MEDICARE

## 2024-06-24 ENCOUNTER — ANESTHESIA (OUTPATIENT)
Dept: SURGERY | Facility: HOSPITAL | Age: 89
End: 2024-06-24
Payer: MEDICARE

## 2024-06-24 VITALS
OXYGEN SATURATION: 96 % | HEART RATE: 66 BPM | HEIGHT: 70 IN | WEIGHT: 198 LBS | BODY MASS INDEX: 28.35 KG/M2 | TEMPERATURE: 97.5 F | DIASTOLIC BLOOD PRESSURE: 85 MMHG | RESPIRATION RATE: 16 BRPM | SYSTOLIC BLOOD PRESSURE: 136 MMHG

## 2024-06-24 PROCEDURE — 250N000009 HC RX 250: Performed by: NURSE ANESTHETIST, CERTIFIED REGISTERED

## 2024-06-24 PROCEDURE — 99100 ANES PT EXTEME AGE<1 YR&>70: CPT | Performed by: NURSE ANESTHETIST, CERTIFIED REGISTERED

## 2024-06-24 PROCEDURE — 272N000001 HC OR GENERAL SUPPLY STERILE: Performed by: OPHTHALMOLOGY

## 2024-06-24 PROCEDURE — 710N000012 HC RECOVERY PHASE 2, PER MINUTE: Performed by: OPHTHALMOLOGY

## 2024-06-24 PROCEDURE — 999N000141 HC STATISTIC PRE-PROCEDURE NURSING ASSESSMENT: Performed by: OPHTHALMOLOGY

## 2024-06-24 PROCEDURE — 66984 XCAPSL CTRC RMVL W/O ECP: CPT | Performed by: NURSE ANESTHETIST, CERTIFIED REGISTERED

## 2024-06-24 PROCEDURE — V2632 POST CHMBR INTRAOCULAR LENS: HCPCS | Performed by: OPHTHALMOLOGY

## 2024-06-24 PROCEDURE — 250N000009 HC RX 250: Performed by: OPHTHALMOLOGY

## 2024-06-24 PROCEDURE — 370N000017 HC ANESTHESIA TECHNICAL FEE, PER MIN: Performed by: OPHTHALMOLOGY

## 2024-06-24 PROCEDURE — 250N000011 HC RX IP 250 OP 636: Mod: JZ | Performed by: OPHTHALMOLOGY

## 2024-06-24 PROCEDURE — 360N000076 HC SURGERY LEVEL 3, PER MIN: Performed by: OPHTHALMOLOGY

## 2024-06-24 DEVICE — IMPLANTABLE DEVICE: Type: IMPLANTABLE DEVICE | Site: EYE | Status: FUNCTIONAL

## 2024-06-24 RX ORDER — ACETAMINOPHEN 325 MG/1
650 TABLET ORAL
Status: COMPLETED | OUTPATIENT
Start: 2024-06-24 | End: 2024-06-24

## 2024-06-24 RX ORDER — TETRACAINE HYDROCHLORIDE 5 MG/ML
SOLUTION OPHTHALMIC PRN
Status: DISCONTINUED | OUTPATIENT
Start: 2024-06-24 | End: 2024-06-24 | Stop reason: HOSPADM

## 2024-06-24 RX ORDER — PROPARACAINE HYDROCHLORIDE 5 MG/ML
1 SOLUTION/ DROPS OPHTHALMIC ONCE
Status: COMPLETED | OUTPATIENT
Start: 2024-06-24 | End: 2024-06-24

## 2024-06-24 RX ORDER — LABETALOL 20 MG/4 ML (5 MG/ML) INTRAVENOUS SYRINGE
10
Status: DISCONTINUED | OUTPATIENT
Start: 2024-06-24 | End: 2024-06-24 | Stop reason: HOSPADM

## 2024-06-24 RX ORDER — ONDANSETRON 4 MG/1
4 TABLET, ORALLY DISINTEGRATING ORAL EVERY 30 MIN PRN
Status: DISCONTINUED | OUTPATIENT
Start: 2024-06-24 | End: 2024-06-24 | Stop reason: HOSPADM

## 2024-06-24 RX ORDER — SODIUM CHLORIDE, SODIUM LACTATE, POTASSIUM CHLORIDE, CALCIUM CHLORIDE 600; 310; 30; 20 MG/100ML; MG/100ML; MG/100ML; MG/100ML
INJECTION, SOLUTION INTRAVENOUS CONTINUOUS
Status: DISCONTINUED | OUTPATIENT
Start: 2024-06-24 | End: 2024-06-24 | Stop reason: HOSPADM

## 2024-06-24 RX ORDER — MOXIFLOXACIN 5 MG/ML
SOLUTION/ DROPS OPHTHALMIC PRN
Status: DISCONTINUED | OUTPATIENT
Start: 2024-06-24 | End: 2024-06-24 | Stop reason: HOSPADM

## 2024-06-24 RX ORDER — LIDOCAINE HYDROCHLORIDE 10 MG/ML
INJECTION, SOLUTION EPIDURAL; INFILTRATION; INTRACAUDAL; PERINEURAL PRN
Status: DISCONTINUED | OUTPATIENT
Start: 2024-06-24 | End: 2024-06-24 | Stop reason: HOSPADM

## 2024-06-24 RX ORDER — LIDOCAINE 40 MG/G
CREAM TOPICAL
Status: DISCONTINUED | OUTPATIENT
Start: 2024-06-24 | End: 2024-06-24 | Stop reason: HOSPADM

## 2024-06-24 RX ORDER — DEXAMETHASONE SODIUM PHOSPHATE 10 MG/ML
4 INJECTION, SOLUTION INTRAMUSCULAR; INTRAVENOUS
Status: DISCONTINUED | OUTPATIENT
Start: 2024-06-24 | End: 2024-06-24 | Stop reason: HOSPADM

## 2024-06-24 RX ORDER — LIDOCAINE HYDROCHLORIDE 20 MG/ML
INJECTION, SOLUTION INFILTRATION; PERINEURAL PRN
Status: DISCONTINUED | OUTPATIENT
Start: 2024-06-24 | End: 2024-06-24

## 2024-06-24 RX ORDER — CYCLOPENTOLAT/TROPIC/PHENYLEPH 1%-1%-2.5%
1 DROPS (EA) OPHTHALMIC (EYE)
Status: COMPLETED | OUTPATIENT
Start: 2024-06-24 | End: 2024-06-24

## 2024-06-24 RX ORDER — LEVOBUNOLOL HYDROCHLORIDE 5 MG/ML
SOLUTION/ DROPS OPHTHALMIC PRN
Status: DISCONTINUED | OUTPATIENT
Start: 2024-06-24 | End: 2024-06-24 | Stop reason: HOSPADM

## 2024-06-24 RX ORDER — PHENYLEPHRINE HYDROCHLORIDE 100 MG/ML
1 SOLUTION/ DROPS OPHTHALMIC ONCE
Status: COMPLETED | OUTPATIENT
Start: 2024-06-24 | End: 2024-06-24

## 2024-06-24 RX ORDER — CYCLOPENTOLATE HYDROCHLORIDE 20 MG/ML
1 SOLUTION/ DROPS OPHTHALMIC ONCE
Status: COMPLETED | OUTPATIENT
Start: 2024-06-24 | End: 2024-06-24

## 2024-06-24 RX ORDER — PILOCARPINE HYDROCHLORIDE 10 MG/ML
SOLUTION/ DROPS OPHTHALMIC PRN
Status: DISCONTINUED | OUTPATIENT
Start: 2024-06-24 | End: 2024-06-24 | Stop reason: HOSPADM

## 2024-06-24 RX ORDER — PHENYLEPHRINE HYDROCHLORIDE 100 MG/ML
1 SOLUTION/ DROPS OPHTHALMIC
Status: COMPLETED | OUTPATIENT
Start: 2024-06-24 | End: 2024-06-24

## 2024-06-24 RX ORDER — NALOXONE HYDROCHLORIDE 0.4 MG/ML
0.1 INJECTION, SOLUTION INTRAMUSCULAR; INTRAVENOUS; SUBCUTANEOUS
Status: DISCONTINUED | OUTPATIENT
Start: 2024-06-24 | End: 2024-06-24 | Stop reason: HOSPADM

## 2024-06-24 RX ORDER — LIDOCAINE HYDROCHLORIDE 20 MG/ML
JELLY TOPICAL ONCE
Status: COMPLETED | OUTPATIENT
Start: 2024-06-24 | End: 2024-06-24

## 2024-06-24 RX ORDER — MOXIFLOXACIN 5 MG/ML
1 SOLUTION/ DROPS OPHTHALMIC
Status: COMPLETED | OUTPATIENT
Start: 2024-06-24 | End: 2024-06-24

## 2024-06-24 RX ORDER — HYDRALAZINE HYDROCHLORIDE 20 MG/ML
2.5-5 INJECTION INTRAMUSCULAR; INTRAVENOUS EVERY 10 MIN PRN
Status: DISCONTINUED | OUTPATIENT
Start: 2024-06-24 | End: 2024-06-24 | Stop reason: HOSPADM

## 2024-06-24 RX ORDER — PROPARACAINE HYDROCHLORIDE 5 MG/ML
1 SOLUTION/ DROPS OPHTHALMIC
Status: COMPLETED | OUTPATIENT
Start: 2024-06-24 | End: 2024-06-24

## 2024-06-24 RX ORDER — ONDANSETRON 2 MG/ML
4 INJECTION INTRAMUSCULAR; INTRAVENOUS EVERY 30 MIN PRN
Status: DISCONTINUED | OUTPATIENT
Start: 2024-06-24 | End: 2024-06-24 | Stop reason: HOSPADM

## 2024-06-24 RX ADMIN — Medication 1 DROP: at 10:56

## 2024-06-24 RX ADMIN — Medication 1 DROP: at 10:30

## 2024-06-24 RX ADMIN — MOXIFLOXACIN OPHTHALMIC SOLUTION 1 DROP: 5 SOLUTION/ DROPS OPHTHALMIC at 10:40

## 2024-06-24 RX ADMIN — MOXIFLOXACIN OPHTHALMIC SOLUTION 1 DROP: 5 SOLUTION/ DROPS OPHTHALMIC at 10:34

## 2024-06-24 RX ADMIN — PROPARACAINE HYDROCHLORIDE 1 DROP: 5 SOLUTION/ DROPS OPHTHALMIC at 10:19

## 2024-06-24 RX ADMIN — PROPARACAINE HYDROCHLORIDE 1 DROP: 5 SOLUTION/ DROPS OPHTHALMIC at 10:55

## 2024-06-24 RX ADMIN — PHENYLEPHRINE HYDROCHLORIDE 1 DROP: 100 SOLUTION/ DROPS OPHTHALMIC at 11:38

## 2024-06-24 RX ADMIN — PHENYLEPHRINE HYDROCHLORIDE 1 DROP: 100 SOLUTION/ DROPS OPHTHALMIC at 10:27

## 2024-06-24 RX ADMIN — LIDOCAINE HYDROCHLORIDE: 20 JELLY TOPICAL at 10:58

## 2024-06-24 RX ADMIN — CYCLOPENTOLATE HYDROCHLORIDE 1 DROP: 20 SOLUTION/ DROPS OPHTHALMIC at 10:21

## 2024-06-24 RX ADMIN — LIDOCAINE HYDROCHLORIDE 100 MG: 20 INJECTION, SOLUTION INFILTRATION; PERINEURAL at 12:27

## 2024-06-24 RX ADMIN — MOXIFLOXACIN OPHTHALMIC SOLUTION 1 DROP: 5 SOLUTION/ DROPS OPHTHALMIC at 10:50

## 2024-06-24 ASSESSMENT — ACTIVITIES OF DAILY LIVING (ADL)
ADLS_ACUITY_SCORE: 25

## 2024-06-24 NOTE — ANESTHESIA CARE TRANSFER NOTE
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    Anesthesia Type:   MAC     Note:    Oropharynx: spontaneously breathing and oropharynx clear of all foreign objects  Level of Consciousness: drowsy  Oxygen Supplementation: room air    Independent Airway: airway patency satisfactory and stable  Dentition: dentition unchanged  Vital Signs Stable: post-procedure vital signs reviewed and stable  Report to RN Given: handoff report given  Patient transferred to: Phase II    Handoff Report: Identifed the Patient, Identified the Reponsible Provider, Reviewed the pertinent medical history, Discussed the surgical course, Reviewed Intra-OP anesthesia mangement and issues during anesthesia, Set expectations for post-procedure period and Allowed opportunity for questions and acknowledgement of understanding      Vitals:  Vitals Value Taken Time   BP     Temp     Pulse     Resp     SpO2         Electronically Signed By: MARU Camarena CRNA  June 24, 2024  12:52 PM

## 2024-06-24 NOTE — ANESTHESIA POSTPROCEDURE EVALUATION
Patient: Vinny Henry    Procedure: Procedure(s):  Phacoemulsification Cataract Extraction Posterior Chamber Lens Right Eye       Anesthesia Type:  MAC    Note:  Disposition: Outpatient   Postop Pain Control: Uneventful            Sign Out: Well controlled pain   PONV: No   Neuro/Psych: Uneventful            Sign Out: Acceptable/Baseline neuro status   Airway/Respiratory: Uneventful            Sign Out: Acceptable/Baseline resp. status   CV/Hemodynamics: Uneventful            Sign Out: Acceptable CV status; No obvious hypovolemia; No obvious fluid overload   Other NRE: NONE   DID A NON-ROUTINE EVENT OCCUR? No           Last vitals:  Vitals Value Taken Time   /85 06/24/24 1315   Temp 97.5  F (36.4  C) 06/24/24 1300   Pulse 66 06/24/24 1315   Resp 16 06/24/24 1315   SpO2 96 % 06/24/24 1315       Electronically Signed By: MARU Camarena CRNA  June 24, 2024  1:49 PM

## 2024-06-24 NOTE — OP NOTE
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  Lidocaine 2% jelly topically and Lidocaine 1% preservative-free intracamerally.     PROCEDURE:  The patient was brought to the Operating Room and prepped and draped in a sterile manner.  A wire lid speculum was placed.  A paracentesis was created and 1% Lidocaine was instilled in the anterior chamber.  The anterior chamber was then filled with Amvisc viscoelastic.  A clear cornea temporal wound was created using a 2.8 mm keratome.  A cystotome was used to initiate a flap in the anterior capsule and a Utrata forceps was used to create a continuous tear capsulorhexis.  Hydrodissection was performed.  The phacoemulsification tip was inserted into the eye and the nucleus and epinucleus were removed using a divide and conquer technique.  The residual cortex was removed using the I/A handpiece.  The anterior chamber was then refilled with viscoelastic and the wound was enlarged with the keratome.  The intraocular lens, 22.5 diopter, Model LI61AO, was placed into the injector and injected into the capsular bag. It was checked to make sure that it was central and stable.  Residual viscoelastic was removed using the I/A.  The anterior chamber was refilled with BSS.  The wounds were hydrated with BSS and were noted to be watertight with no suture necessary.  Topical pilocarpine 1%, Betagan, and Vigamox was applied.  A hard shield was placed.     The  patient tolerated the procedure well and was sent to the Recovery Room in satisfactory condition.

## 2024-06-24 NOTE — OR NURSING
Patient and responsible adult given discharge instructions with no questions regarding instructions. Marika score 20. Pain level 0/10.  Discharged from unit via ambulation. Patient discharged to home.

## 2024-06-24 NOTE — DISCHARGE INSTRUCTIONS
After Anesthesia (Sleep Medicine)  What should I do after anesthesia?  You should rest and relax for the next 24 hours. Avoid risky or difficult (strenuous) activity. A responsible adult should stay with you overnight.  Don't drive or use any heavy equipment for 24 hours. Even if you feel normal, your reactions may be affected by the sleep medicine given to you.  Don't drink alcohol or make any important decisions for 24 hours.  Slowly get back to your regular diet, as you feel able.  How should I expect to feel?  It's normal to feel dizzy, light-headed, or faint for up to a full day after anesthesia or while taking pain medicine. If this happens:   Sit down for a few minutes before standing.  Have someone help you when you get up to walk or use the bathroom.  If you have nausea (feel sick to your stomach) or vomit (throw up):   Drink clear liquids (such as apple juice, ginger ale, broth, or 7UP) until you feel better.  If you feel sick to your stomach, or you keep vomiting for 24 hours, please call the doctor.  What else should I know?  You might have a dry mouth, sore throat, muscle aches, or trouble sleeping. These should go away after 24 hours.  Please contact your doctor if you have any other symptoms that concern you, such as fever, pain, bleeding, fluid drainage, swelling, or headache, or if it's been over 8 to 10 hours and you still aren't able to pee (urinate).  If you have a history of sleep apnea, it's very important to use your CPAP machine for the next 24 hours when you nap or sleep.   For informational purposes only. Not to replace the advice of your health care provider. Copyright   2023 [employer]. All rights reserved. Clinically reviewed by Wang Mario MD. [employer] [id number] - REV 09/23.

## (undated) DEVICE — IRRIGATION-NACL 1000ML

## (undated) DEVICE — INSTRUMENT WIPE VISIWIPE 581047

## (undated) DEVICE — GLOVE PROTEXIS POWDER FREE CLSC 7.5  2D72PL75X

## (undated) DEVICE — LIGHT HANDLE COVER

## (undated) DEVICE — SCD SLEEVE-KNEE REG.

## (undated) DEVICE — PACK PHACO STELLARIS VAC 1 AUX DRP 1 NDL WRNCH BL5110

## (undated) DEVICE — Device

## (undated) DEVICE — BDG-COBAN 6 INCH

## (undated) DEVICE — EYE KNIFE SLIT XSTAR VISITEC 2.8MM 45DEG 373728

## (undated) DEVICE — NDL-18G 1 1/2" NON-SAFETY

## (undated) DEVICE — GLV-8.0 ORTHO PROTEXIS PI LF/PF

## (undated) DEVICE — TAPE-MICROFOAM 4 INCH

## (undated) DEVICE — INJECTOR PORT FOR IOL LENSES SOFPORT EZ-28

## (undated) DEVICE — KIT-HANA OR POSITIONING

## (undated) DEVICE — SET HANDPIECE IRRIG/ASPIRATION 2.2-2.8X5.4" 45DEG TIP 85910S

## (undated) DEVICE — IRRIGATION-NACL 3000ML (BAG)

## (undated) DEVICE — BETADINE 5% STERILE OPHTHALMIC SOLUTION 1 OZ.

## (undated) DEVICE — CAUTERY PAD-POLYHESIVE II ADULT

## (undated) DEVICE — PACK EYE CUSTOM SEY32EPMBO

## (undated) DEVICE — BIN-TECNIS DCB00 LENSES

## (undated) DEVICE — GLOVE PROTEXIS POWDER FREE 7.0 ORTHOPEDIC 2D73ET70

## (undated) DEVICE — CAUTERY PENCIL-SMOKE EVACUATION

## (undated) DEVICE — BIN-CATARACT BIN BN37

## (undated) DEVICE — DRAPE-U DRAPE-CLEAR 47" X 51"

## (undated) DEVICE — TAPE-DURAPORE 2 INCH

## (undated) DEVICE — APPLICATOR-CHLORAPREP 26ML TINTED CHG 2%+ 70% IPA-SURGICAL

## (undated) DEVICE — GLV-8.5 ORTHO PROTEXIS PI LF/PF

## (undated) DEVICE — EYE CANN IRR 25GA CYSTOTOME 581610

## (undated) DEVICE — SUTURE-VICRYL #1 CP-1 VIOLET J468H

## (undated) DEVICE — BIN-LENS IMPLANT CART

## (undated) DEVICE — LABEL-STERILE PREPRINTED FOR OR

## (undated) DEVICE — SENSOR-OXISENSOR II ADULT

## (undated) DEVICE — EYE KNIFE MICRO 15DEG BEVEL 377516

## (undated) DEVICE — EYE PREP BETADINE 5% SOLUTION 30ML 0065-0411-30

## (undated) DEVICE — CANNULA IRR 7/8IN 25GA VSTC VSCFL 45D 9MM DISP 585045

## (undated) DEVICE — BLADE-SAGITTAL 18MM X 90MM X 1.27MM

## (undated) DEVICE — AQUAMANTYS 6.0MM SEALER PROBE

## (undated) DEVICE — CANNULA IRR 7/8IN 30GA VSTC VSCFL 45D 9MM DISP 585046

## (undated) DEVICE — GLV-8.0 PROTEXIS PI BLUE W/NEU-THERA LF/PF

## (undated) DEVICE — EYE CANN IRR 25GA HYDRODISSECTING 585037

## (undated) DEVICE — IRRIGATION-H2O 1000ML

## (undated) DEVICE — SUTURE-VICRYL #1 CT-1 UNDYED J261H

## (undated) DEVICE — GLV-8.5 PROTEXIS PI BLUE W/NEU-THERA LF/PF

## (undated) DEVICE — SYRINGE-20CC LUER LOCK

## (undated) DEVICE — PACK-HIP-CUSTOM

## (undated) DEVICE — DRAPE-STERI-U POUCH

## (undated) DEVICE — SUTURE-VICRYL 2-0 CT-1 VCP259H

## (undated) DEVICE — DRAPE-U DRAPE SPLIT SHEET 72" X 122"

## (undated) DEVICE — DRSG-AQUACEL AG 3.5" X 10" SURGICAL

## (undated) RX ORDER — ONDANSETRON 2 MG/ML
INJECTION INTRAMUSCULAR; INTRAVENOUS
Status: DISPENSED
Start: 2019-04-22

## (undated) RX ORDER — DEXAMETHASONE SODIUM PHOSPHATE 10 MG/ML
INJECTION, SOLUTION INTRAMUSCULAR; INTRAVENOUS
Status: DISPENSED
Start: 2019-04-22

## (undated) RX ORDER — PROPOFOL 10 MG/ML
INJECTION, EMULSION INTRAVENOUS
Status: DISPENSED
Start: 2019-04-22

## (undated) RX ORDER — FENTANYL CITRATE 50 UG/ML
INJECTION, SOLUTION INTRAMUSCULAR; INTRAVENOUS
Status: DISPENSED
Start: 2019-04-22

## (undated) RX ORDER — PHENYLEPHRINE HCL IN 0.9% NACL 1 MG/10 ML
SYRINGE (ML) INTRAVENOUS
Status: DISPENSED
Start: 2019-04-22

## (undated) RX ORDER — DEXMEDETOMIDINE HYDROCHLORIDE 4 UG/ML
INJECTION, SOLUTION INTRAVENOUS
Status: DISPENSED
Start: 2024-06-24

## (undated) RX ORDER — EPHEDRINE SULFATE 50 MG/ML
INJECTION, SOLUTION INTRAVENOUS
Status: DISPENSED
Start: 2019-04-22